# Patient Record
Sex: MALE | Employment: OTHER | URBAN - METROPOLITAN AREA
[De-identification: names, ages, dates, MRNs, and addresses within clinical notes are randomized per-mention and may not be internally consistent; named-entity substitution may affect disease eponyms.]

---

## 2019-10-11 DIAGNOSIS — M25.569 KNEE PAIN: Primary | ICD-10-CM

## 2019-10-18 ENCOUNTER — CLINICAL SUPPORT (OUTPATIENT)
Dept: REHABILITATION | Facility: HOSPITAL | Age: 74
End: 2019-10-18
Payer: MEDICARE

## 2019-10-18 DIAGNOSIS — S76.111A QUADRICEPS MUSCLE STRAIN, RIGHT, INITIAL ENCOUNTER: ICD-10-CM

## 2019-10-18 DIAGNOSIS — G89.29 CHRONIC PAIN OF RIGHT KNEE: ICD-10-CM

## 2019-10-18 DIAGNOSIS — M25.561 CHRONIC PAIN OF RIGHT KNEE: ICD-10-CM

## 2019-10-18 PROCEDURE — 97112 NEUROMUSCULAR REEDUCATION: CPT | Performed by: PHYSICAL THERAPIST

## 2019-10-18 PROCEDURE — 97161 PT EVAL LOW COMPLEX 20 MIN: CPT | Performed by: PHYSICAL THERAPIST

## 2019-10-18 PROCEDURE — 97110 THERAPEUTIC EXERCISES: CPT | Performed by: PHYSICAL THERAPIST

## 2019-10-18 NOTE — PLAN OF CARE
[facility]  Physical Therapy Initial Evaluation    Name: Keith Mccurdy  Clinic Number: [id number]    Therapy Diagnosis:   Encounter Diagnoses   Name Primary?    Chronic pain of right knee     Quadriceps muscle strain, right, initial encounter      Physician: Brandin Osullivan MD    Physician Orders: PT Eval and Treat:  core/lumbar program, patellofemoral and quadriceps program with gentle stretching/strengthening, hip abductor/ gluteal strengthening and stretching, gait training; modalities as needed - ok for ultrasound, ionto/phonophoresis, deep heat, manual massage, stim treatment, TENS, pilates/yoga stretching, acupuncture, and other modalities at discretion of therapist, ok for aquatherapy  Medical Diagnosis from Referral: Quadriceps muscle strain, right, sequela (S76.111S), primary osteoarthritis of right knee (M17.11), chronic pain of right knee (M25.561, G89.29)  Evaluation Date: 10/18/2019  Authorization Period Expiration: 11/29/2019  Plan of Care Expiration: 11/29/2019  Visit # / Visits authorized: 1/ 20    Precautions: Standard    Time In: 1140  Time Out: 1240  Total Billable Time: 60 minutes    SUBJECTIVE   Date of onset: early summer  History of current condition - Keith Mccurdy is a 74 y.o. male whom reports knee was on a batch of rocks when working in his yard one day in early summer, but did not experience any pain until afterwards.  He reports that he thought it would resolve on its own, but waited 2-4 weeks for the pain to become more intense, causing him to seek care from his physician.  Patient reports that imaging revealed no structural abnormality, and that he was prescribed physical therapy.  He reports that he had approximately 8 sessions (2 times per week) of physical therapy, but that the pain resolved at the front of the knee (points to patellar tendon) and began on the side and front of the knee (points to lateral quadriceps).  Patient reports that his knee has  "improved over the past two-three weeks. He reports that he has noticed more fluid movements and less swelling. He reports that he is having less pain sleeping at night and greater pain when laying down, but that this past week has been better. Patient also reports that pain is also present after initial standing from prolonged sitting.     Medical History:   No past medical history on file.    Surgical History:   Keith Mccurdy  has a past surgical history that includes Hernia repair.    Medications:   Keith currently has no medications in their medication list.    Allergies:   Review of patient's allergies indicates:  No Known Allergies     Imaging: MRI studies, X-Ray (2)     Prior Therapy: Yes  Social History: Patient lives with their spouse.  Occupation: Patient is self-employed, sit for 30 minutes at a time.  Patient works in the yard as a hobby.  Prior Level of Function: Independent with all activities.  Current Level of Function: Limited in regularly working in the lawn, driving, routine movements, makes him sit more.    Pain:  Current 5/10, worst 7/10, best 2/10   Location: right knee(s)  Description: Throbbing and "vascilates"  Aggravating Factors: Sitting, Night Time and knee extension  Easing Factors: massage, pain medication and heating pad    Dominant Extremity: Right    Pts goals: Pt reported goals are to remove this problem.    OBJECTIVE   (x = not tested due to pain and/or inability to obtain test position)    RANGE OF MOTION:    Knee ROM Right  10/18/2019 Left  10/18/2019 Pain/Dysfunction with Movement Goal   Knee Flexion (135) 120 132 Tightness with flexion    Knee Extension (0) 10 0 Pain with extension      STRENGTH:    L/E MMT Right  10/18/2019 Left  10/18/2019 Pain/Dysfunction with Movement Goal   Hip Flexion  4+/5 4+/5  5/5 B    Hip Extension  4/5 4/5  5/5 B   Hip Abduction  4/5 4/5  5/5 B   Knee Extension 4-/5 5/5  5/5 B   Knee Flexion 5/5 5/5  5/5 B       MUSCLE LENGTH:     Muscle Tested  " Right  10/18/2019 Left   10/18/2019 Goal   Hamstrings  decreased normal Normal B     Sensation:  Sensation is intact to light touch    Palpation: Unremarkable    Posture:  Pt presents with postural abnormalities which include: Knee flexion in standing    Gait Analysis: The patient ambulated with the following assistive device: none; the pt presents with the following gait abnormalities: decreased right knee extension during stance phase of gait      FUNCTION:     LOWER EXTREMITY FUNCTIONAL SCALE  Patient-reported functional assessment scores are rated as follows:  A score of 0/4 represents extreme difficulty or unable to perform the activity. A score of 1/4 represents quite a bit of difficulty. A score of 2/4 represents moderate difficulty. A score of 3/4 represents a little bit of difficulty. A score of 4/4 represents no difficulty.      10/18/2019   1. Any of your usual work, housework or school activities 4/4   2. Your usual hobbies, sporting 4/4   3. Getting in and out of tub 3/4   4. Walking between rooms 3/4   5. Putting on shoes or socks 3/4   6. Squatting 1/4   7. Lifting an object from the ground   4/4   8. Performing light activities around the home 4/4   9. Performing heavy activities around the home 2/4   10. Getting in and out of car 2/4   11. Walking 2 blocks 3/4   12.Walking a mile -/4   13. Getting up and down 1 flight of stairs 2/4   14. Standing for 1 hour 1/4   15. Sitting for an hour  2/4   16. Running on even ground  2/4   17. Running on uneven ground 2/4   18. Making sharp turns when running fast -/4   19. Hopping  2/4   20. Rolling over in bed 2/4       Patient reports 63.89% ability based on score of the Lower Extremity Functional Scale on 10/18/2019.    TREATMENT   Treatment Time In: 1210  Treatment Time Out: 1240  Total Treatment time separate from Evaluation: 30 minutes    Keith Mccurdy received therapeutic exercises to develop strength, flexibility and posture for 15 minutes  including:    Exercise 10/18/2019   Seated hamstring stretch 3 x 30 seconds   Glute set 10 x 10 seconds                           x = exercise details same as prior session    Keith Mccurdy participated in neuromuscular re-education activities to improve: Kinesthetic and Sense for 15 minutes. The following activities were included:  Kinesio tape was applied to the right knee 20% tension, 4.5 blocks.  Home Exercises and Patient Education Provided    Education/Self-Care provided:   Patient educated on the impairments noted above and the effects of physical therapy intervention to improve overall condition and QOL.     Written Home Exercises Provided: yes.  Exercises were reviewed and Keith Mccurdy was able to demonstrate them prior to the end of the session.  Keith Mccurdy demonstrated good  understanding of the education provided.     See EMR under Patient Instructions for exercises provided 10/18/2019.    ASSESSMENT   Keith is a 74 y.o. male referred to outpatient Physical Therapy with a medical diagnosis of chronic knee pain, quadriceps muscle strain, and primary osteoarthritis. Pt presents with impairments including: decreased ROM, decreased strength, decreased muscle length and decreased overall function.    Pt prognosis is Good.   Pt will benefit from skilled outpatient Physical Therapy to address the deficits stated above and in the chart below, provide pt/family education, and to maximize pt's level of independence.     Plan of care discussed with patient: Yes  Pt's spiritual, cultural and educational needs considered and patient is agreeable to the plan of care and goals as stated below:     Anticipated Barriers for therapy: none    Medical Necessity is demonstrated by the following  History  Co-morbidities and personal factors that may impact the plan of care Co-morbidities:   advanced age    Personal Factors:   no deficits     low   Examination  Body Structures and Functions, activity limitations and  participation restrictions that may impact the plan of care Body Regions:   lower extremities    Body Systems:    ROM  strength    Activity limitations:   Learning and applying knowledge  no deficits    General Tasks and Commands  no deficits    Communication  no deficits    Mobility  no deficits    Self care  no deficits    Domestic Life  doing yardwork    Interactions/Relationships  no deficits    Life Areas  no deficits    Community and Social Life  community life  recreation and leisure         low   Clinical Presentation stable and uncomplicated low   Decision Making/ Complexity Score: low       GOALS:    Short Term Goals:  3 weeks    1. Pain: Pt will demonstrate improved pain by reports of less than or equal to 6/10 worst pain on the verbal rating scale in order to progress toward maximal functional ability and improve QOL.    2. Function: Patient will demonstrate improved function as indicated by a score of greater than or equal to 70% on the Lower Extremity Functional Scale    3. Mobility: Patient will improve AROM to 50% of stated goals, listed in objective measures above, in order to progress towards independence with functional activities.     4. Strength: Patient will improve strength to 50% of stated goals, listed in objective measures above, in order to progress towards independence with functional activities.     5. HEP: Patient will demonstrate independence with HEP in order to progress toward functional independence.      Long Term Goals:  6 weeks    1. Pain: Pt will demonstrate improved pain by reports of less than or equal to 5/10 worst pain on the verbal rating scale in order to progress toward maximal functional ability and improve QOL.      2. Function: Patient will demonstrate improved function as indicated by a score of greater than or equal to 72% on the Lower Extremity Functional Scale    3. Mobility: Patient will improve AROM to stated goals, listed in objective measures above, in order to  return to maximal functional potential and improve quality of life.    4. Strength: Patient will improve strength to stated goals, listed in objective measures above, in order to improve functional independence and quality of life.    5. Patient will return to normal ADL's, IADL's, community involvement, recreational activities, and work-related activities with less than or equal to 5/10 pain and maximal function.         PLAN   Plan of care Certification: 10/18/2019 to 11/29/2019.    Outpatient Physical Therapy 2 times weekly for 6 weeks to include any combination of the following interventions: dry needling, modalities, electrical stimulation (IFC, Pre-Mod, Attended with Functional Dry Needling), Gait Training, Manual Therapy, Neuromuscular Re-ed, Patient Education, Self Care, Therapeutic Activites and Therapeutic Exercise     Thank you for this referral.    These services are reasonable and necessary for the conditions set forth above while under my care.    Sharona Avitia, PT, DPT, CSCS

## 2019-10-22 ENCOUNTER — CLINICAL SUPPORT (OUTPATIENT)
Dept: REHABILITATION | Facility: HOSPITAL | Age: 74
End: 2019-10-22
Payer: MEDICARE

## 2019-10-22 DIAGNOSIS — M25.561 CHRONIC PAIN OF RIGHT KNEE: ICD-10-CM

## 2019-10-22 DIAGNOSIS — S76.111A QUADRICEPS MUSCLE STRAIN, RIGHT, INITIAL ENCOUNTER: ICD-10-CM

## 2019-10-22 DIAGNOSIS — G89.29 CHRONIC PAIN OF RIGHT KNEE: ICD-10-CM

## 2019-10-22 PROCEDURE — 97110 THERAPEUTIC EXERCISES: CPT

## 2019-10-22 PROCEDURE — 97014 ELECTRIC STIMULATION THERAPY: CPT

## 2019-10-22 PROCEDURE — 97140 MANUAL THERAPY 1/> REGIONS: CPT

## 2019-10-22 NOTE — PROGRESS NOTES
"  Physical Therapy Daily Treatment Note     Name: Keith Mccurdy  Clinic Number: [id number]    Therapy Diagnosis:   Encounter Diagnoses   Name Primary?    Chronic pain of right knee     Quadriceps muscle strain, right, initial encounter      Physician: Brandin Osullivan MD    Visit Date: 10/22/2019    Physician Orders: PT Eval and Treat:  core/lumbar program, patellofemoral and quadriceps program with gentle stretching/strengthening, hip abductor/ gluteal strengthening and stretching, gait training; modalities as needed - ok for ultrasound, ionto/phonophoresis, deep heat, manual massage, stim treatment, TENS, pilates/yoga stretching, acupuncture, and other modalities at discretion of therapist, ok for aquatherapy  Medical Diagnosis from Referral: Quadriceps muscle strain, right, sequela (S76.111S), primary osteoarthritis of right knee (M17.11), chronic pain of right knee (M25.561, G89.29)  Evaluation Date: 10/18/2019  Authorization Period Expiration: 11/29/2019  Plan of Care Expiration: 11/29/2019  Visit # / Visits authorized: 1/ 20    Precautions:Standard    Time In: 0905  Time Out: 1000  Total Billable Time: 55 minutes    SUBJECTIVE     Pt reports: that he is doing fine today. His right knee is still giving him some issues but he feels like the tape helped.   He was compliant with home exercise program.  Response to previous treatment: decreased pain  Functional change: none noted    Pain: n/a/10  Location: Right knee  TREATMENT     Keith Mccurdy received therapeutic exercises to develop strength, flexibility and posture for 15 minutes including:    Exercise 10/22/2019   Hamstring stretch 10" 10x   Knee extension stretch 2'   Quad sets 30x5"   Supine heel slides  10x   Ball squeeze 20x   Hip abduction theraband Yellow 20x   Short arc quads 3x10           Keith Mccurdy received the following manual therapy techniques: patellar mobilization and soft tissue mobilization to the Right Knee and quadriceps      Keith JAIME " Kaz received the following supervised modalities after being cleared for contradictions: Interferential x 15 minutes to the right knee    Keith Mccurdy received hot pack for 15 minutes to right knee.      Home Exercises Provided and Patient Education Provided     Education/Self-Care provided: (minutes)    Written Home Exercises Provided: Patient instructed to cont prior HEP.  Exercises were reviewed and Keith Mccurdy was able to demonstrate them prior to the end of the session.  Keith Mccurdy demonstrated good  understanding of the education provided.     See EMR under Patient Instructions for exercises provided prior visit.    ASSESSMENT   Patient tolerated all activities well today with no complaints of discomfort but had good fatigue noted with all strengthening activities. Noted increased in right quad area, but patient did not have any increased tenderness during soft tissue mobilization. Patient left treatment feeling better and saying he worked it good.     Keith Mccurdy is progressing well towards his goals.   Pt prognosis is Good.     Pt will continue to benefit from skilled outpatient physical therapy to address the deficits listed in the problem list box on initial evaluation, provide pt/family education and to maximize pt's level of independence in the home and community environment.     Pt's spiritual, cultural and educational needs considered and pt agreeable to plan of care and goals.     Anticipated barriers to physical therapy: none    Goals:   Short Term Goals:  3 weeks     1. Pain: Pt will demonstrate improved pain by reports of less than or equal to 6/10 worst pain on the verbal rating scale in order to progress toward maximal functional ability and improve QOL.     2. Function: Patient will demonstrate improved function as indicated by a score of greater than or equal to 70% on the Lower Extremity Functional Scale     3. Mobility: Patient will improve AROM to 50% of stated goals, listed in  objective measures above, in order to progress towards independence with functional activities.      4. Strength: Patient will improve strength to 50% of stated goals, listed in objective measures above, in order to progress towards independence with functional activities.      5. HEP: Patient will demonstrate independence with HEP in order to progress toward functional independence.        Long Term Goals:  6 weeks     1. Pain: Pt will demonstrate improved pain by reports of less than or equal to 5/10 worst pain on the verbal rating scale in order to progress toward maximal functional ability and improve QOL.       2. Function: Patient will demonstrate improved function as indicated by a score of greater than or equal to 72% on the Lower Extremity Functional Scale     3. Mobility: Patient will improve AROM to stated goals, listed in objective measures above, in order to return to maximal functional potential and improve quality of life.     4. Strength: Patient will improve strength to stated goals, listed in objective measures above, in order to improve functional independence and quality of life.     5. Patient will return to normal ADL's, IADL's, community involvement, recreational activities, and work-related activities with less than or equal to 5/10 pain and maximal function.           PLAN   Plan of care Certification: 10/18/2019 to 11/29/2019.    Continue Plan of Care (POC) and progress per patient tolerance.    Erma Norman, PTA

## 2019-10-25 ENCOUNTER — CLINICAL SUPPORT (OUTPATIENT)
Dept: REHABILITATION | Facility: HOSPITAL | Age: 74
End: 2019-10-25
Payer: MEDICARE

## 2019-10-25 DIAGNOSIS — M25.561 CHRONIC PAIN OF RIGHT KNEE: ICD-10-CM

## 2019-10-25 DIAGNOSIS — G89.29 CHRONIC PAIN OF RIGHT KNEE: ICD-10-CM

## 2019-10-25 DIAGNOSIS — S76.111A QUADRICEPS MUSCLE STRAIN, RIGHT, INITIAL ENCOUNTER: ICD-10-CM

## 2019-10-25 PROCEDURE — 97110 THERAPEUTIC EXERCISES: CPT

## 2019-10-25 PROCEDURE — 97014 ELECTRIC STIMULATION THERAPY: CPT

## 2019-10-25 PROCEDURE — 97140 MANUAL THERAPY 1/> REGIONS: CPT

## 2019-10-25 NOTE — PROGRESS NOTES
"  Physical Therapy Daily Treatment Note     Name: Keith Mccurdy  Clinic Number: [id number]    Therapy Diagnosis:   Encounter Diagnoses   Name Primary?    Chronic pain of right knee     Quadriceps muscle strain, right, initial encounter      Physician: Brandin Osullivan MD    Visit Date: 10/25/2019    Physician Orders: PT Eval and Treat:  core/lumbar program, patellofemoral and quadriceps program with gentle stretching/strengthening, hip abductor/ gluteal strengthening and stretching, gait training; modalities as needed - ok for ultrasound, ionto/phonophoresis, deep heat, manual massage, stim treatment, TENS, pilates/yoga stretching, acupuncture, and other modalities at discretion of therapist, ok for aquatherapy  Medical Diagnosis from Referral: Quadriceps muscle strain, right, sequela (S76.111S), primary osteoarthritis of right knee (M17.11), chronic pain of right knee (M25.561, G89.29)  Evaluation Date: 10/18/2019  Authorization Period Expiration: 11/29/2019  Plan of Care Expiration: 11/29/2019  Visit # / Visits authorized: 3/ 20    Precautions:Standard    Time In: 0900  Time Out: 1000  Total Billable Time: 60 minutes    SUBJECTIVE     Pt reports: that he had the best night of sleep last night that he has had in a long time. He has been doing his exercises at home and he feels like they have really been helping. He also said he used a rice hot pack last night with some ginger oil and that may have helped too.   He was compliant with home exercise program.  Response to previous treatment: decreased pain  Functional change: none noted    Pain: n/a/10  Location: Right knee  TREATMENT     Keith Mccurdy received therapeutic exercises to develop strength, flexibility and posture for 15 minutes including:    Exercise 10/25/2019   Hamstring stretch 10" 10x   Knee extension stretch 2'   Quad sets 30x5"   Supine heel slides  10x   Ball squeeze 20x   Hip abduction theraband Yellow 20x   Short arc quads 3x10           Keith" JAIME Mccurdy received the following manual therapy techniques: patellar mobilization and soft tissue mobilization to the Right Knee and quadriceps for 15 minutes.       Keith Mccurdy received the following supervised modalities after being cleared for contradictions: Interferential x 15 minutes to the right knee    Keith Mccurdy received hot pack for 15 minutes to right knee.      Home Exercises Provided and Patient Education Provided     Education/Self-Care provided: (minutes)    Written Home Exercises Provided: Patient instructed to cont prior HEP.  Exercises were reviewed and Keith Mccurdy was able to demonstrate them prior to the end of the session.  Keith Mccurdy demonstrated good  understanding of the education provided.     See EMR under Patient Instructions for exercises provided prior visit.    ASSESSMENT   Patient tolerated all activities well today with no complaints of discomfort. Noted consistent swelling superolateral to the patella. Patient had decreased tenderness and tightness today during manual therapy.   Keith Mccurdy is progressing well towards his goals.   Pt prognosis is Good.     Pt will continue to benefit from skilled outpatient physical therapy to address the deficits listed in the problem list box on initial evaluation, provide pt/family education and to maximize pt's level of independence in the home and community environment.     Pt's spiritual, cultural and educational needs considered and pt agreeable to plan of care and goals.     Anticipated barriers to physical therapy: none    Goals:   Short Term Goals:  3 weeks     1. Pain: Pt will demonstrate improved pain by reports of less than or equal to 6/10 worst pain on the verbal rating scale in order to progress toward maximal functional ability and improve QOL.     2. Function: Patient will demonstrate improved function as indicated by a score of greater than or equal to 70% on the Lower Extremity Functional Scale     3. Mobility:  Patient will improve AROM to 50% of stated goals, listed in objective measures above, in order to progress towards independence with functional activities.      4. Strength: Patient will improve strength to 50% of stated goals, listed in objective measures above, in order to progress towards independence with functional activities.      5. HEP: Patient will demonstrate independence with HEP in order to progress toward functional independence.        Long Term Goals:  6 weeks     1. Pain: Pt will demonstrate improved pain by reports of less than or equal to 5/10 worst pain on the verbal rating scale in order to progress toward maximal functional ability and improve QOL.       2. Function: Patient will demonstrate improved function as indicated by a score of greater than or equal to 72% on the Lower Extremity Functional Scale     3. Mobility: Patient will improve AROM to stated goals, listed in objective measures above, in order to return to maximal functional potential and improve quality of life.     4. Strength: Patient will improve strength to stated goals, listed in objective measures above, in order to improve functional independence and quality of life.     5. Patient will return to normal ADL's, IADL's, community involvement, recreational activities, and work-related activities with less than or equal to 5/10 pain and maximal function.           PLAN   Plan of care Certification: 10/18/2019 to 11/29/2019.    Continue Plan of Care (POC) and progress per patient tolerance.    Erma Norman, PTA

## 2019-10-29 ENCOUNTER — CLINICAL SUPPORT (OUTPATIENT)
Dept: REHABILITATION | Facility: HOSPITAL | Age: 74
End: 2019-10-29
Payer: MEDICARE

## 2019-10-29 DIAGNOSIS — G89.29 CHRONIC PAIN OF RIGHT KNEE: ICD-10-CM

## 2019-10-29 DIAGNOSIS — S76.111A QUADRICEPS MUSCLE STRAIN, RIGHT, INITIAL ENCOUNTER: ICD-10-CM

## 2019-10-29 DIAGNOSIS — M25.561 CHRONIC PAIN OF RIGHT KNEE: ICD-10-CM

## 2019-10-29 PROCEDURE — 97140 MANUAL THERAPY 1/> REGIONS: CPT | Performed by: PHYSICAL THERAPIST

## 2019-10-29 PROCEDURE — 97110 THERAPEUTIC EXERCISES: CPT | Performed by: PHYSICAL THERAPIST

## 2019-10-29 NOTE — PROGRESS NOTES
"  Physical Therapy Daily Treatment Note     Name: Keith Mccurdy  Clinic Number: [id number]    Therapy Diagnosis:   Encounter Diagnoses   Name Primary?    Chronic pain of right knee     Quadriceps muscle strain, right, initial encounter      Physician: Brandin Osullivan MD    Visit Date: 10/29/2019    Physician Orders: PT Eval and Treat:  core/lumbar program, patellofemoral and quadriceps program with gentle stretching/strengthening, hip abductor/ gluteal strengthening and stretching, gait training; modalities as needed - ok for ultrasound, ionto/phonophoresis, deep heat, manual massage, stim treatment, TENS, pilates/yoga stretching, acupuncture, and other modalities at discretion of therapist, ok for aquatherapy  Medical Diagnosis from Referral: Quadriceps muscle strain, right, sequela (S76.111S), primary osteoarthritis of right knee (M17.11), chronic pain of right knee (M25.561, G89.29)  Evaluation Date: 10/18/2019  Authorization Period Expiration: 11/29/2019  Plan of Care Expiration: 11/29/2019  Visit # / Visits authorized: 4/ 20    Precautions:Standard    Time In: 1030  Time Out: 1130  Total Billable Time: 55 minutes    SUBJECTIVE     Pt reports that he and his wife have really noticed an improvement since beginning physical therapy.  He was compliant with home exercise program.  Response to previous treatment: decreased pain  Functional change: none noted    Pain: n/a/10  Location: Right knee  TREATMENT     Keith Mccurdy received therapeutic exercises to develop strength, flexibility and posture for 40 minutes including:    Exercise 10/25/2019 10/29/2019   Hamstring stretch 10" 10x x   Crossover stretch   10" 10x   Knee extension stretch 2' x   Quad sets 30x5" x   Supine heel slides  10x x   Ball squeeze 20x x   Hip abduction theraband Yellow 20x 30x   Short arc quads 3x10 x   Glute sets  10" 10x   Kinesio tape 4.5 blocks to right knee  x     Keith Mccurdy received the following manual therapy techniques: " patellar mobilization and soft tissue mobilization to the Right Knee and quadriceps for 15 minutes.     Home Exercises Provided and Patient Education Provided     Written Home Exercises Provided: Patient instructed to cont prior HEP.  Exercises were reviewed and Keith Mccurdy was able to demonstrate them prior to the end of the session.  Keith Mccurdy demonstrated good  understanding of the education provided.     See EMR under Patient Instructions for exercises provided prior visit.    ASSESSMENT   Patient tolerated all activities well today with no complaints of discomfort. Noted increased right knee flexion range of motion to 130 degrees following manual therapy and stretching.  Keith Mccurdy is progressing well towards his goals.   Pt prognosis is Good.     Pt will continue to benefit from skilled outpatient physical therapy to address the deficits listed in the problem list box on initial evaluation, provide pt/family education and to maximize pt's level of independence in the home and community environment.     Pt's spiritual, cultural and educational needs considered and pt agreeable to plan of care and goals.     Anticipated barriers to physical therapy: none    Goals:   Short Term Goals:  3 weeks     1. Pain: Pt will demonstrate improved pain by reports of less than or equal to 6/10 worst pain on the verbal rating scale in order to progress toward maximal functional ability and improve QOL.     2. Function: Patient will demonstrate improved function as indicated by a score of greater than or equal to 70% on the Lower Extremity Functional Scale     3. Mobility: Patient will improve AROM to 50% of stated goals, listed in objective measures above, in order to progress towards independence with functional activities.      4. Strength: Patient will improve strength to 50% of stated goals, listed in objective measures above, in order to progress towards independence with functional activities.      5. HEP:  Patient will demonstrate independence with HEP in order to progress toward functional independence.        Long Term Goals:  6 weeks     1. Pain: Pt will demonstrate improved pain by reports of less than or equal to 5/10 worst pain on the verbal rating scale in order to progress toward maximal functional ability and improve QOL.       2. Function: Patient will demonstrate improved function as indicated by a score of greater than or equal to 72% on the Lower Extremity Functional Scale     3. Mobility: Patient will improve AROM to stated goals, listed in objective measures above, in order to return to maximal functional potential and improve quality of life.     4. Strength: Patient will improve strength to stated goals, listed in objective measures above, in order to improve functional independence and quality of life.     5. Patient will return to normal ADL's, IADL's, community involvement, recreational activities, and work-related activities with less than or equal to 5/10 pain and maximal function.       PLAN   Plan of care Certification: 10/18/2019 to 11/29/2019.    Continue Plan of Care (POC) and progress per patient tolerance.    Sharona Avitia, PT, DPT, CSCS

## 2019-10-31 ENCOUNTER — CLINICAL SUPPORT (OUTPATIENT)
Dept: REHABILITATION | Facility: HOSPITAL | Age: 74
End: 2019-10-31
Payer: MEDICARE

## 2019-10-31 DIAGNOSIS — G89.29 CHRONIC PAIN OF RIGHT KNEE: ICD-10-CM

## 2019-10-31 DIAGNOSIS — S76.111A QUADRICEPS MUSCLE STRAIN, RIGHT, INITIAL ENCOUNTER: ICD-10-CM

## 2019-10-31 DIAGNOSIS — M25.561 CHRONIC PAIN OF RIGHT KNEE: ICD-10-CM

## 2019-10-31 PROCEDURE — 97014 ELECTRIC STIMULATION THERAPY: CPT

## 2019-10-31 PROCEDURE — 97110 THERAPEUTIC EXERCISES: CPT

## 2019-10-31 PROCEDURE — 97140 MANUAL THERAPY 1/> REGIONS: CPT

## 2019-10-31 NOTE — PROGRESS NOTES
"  Physical Therapy Daily Treatment Note     Name: Keith Mccurdy  Clinic Number: [id number]    Therapy Diagnosis:   Encounter Diagnoses   Name Primary?    Chronic pain of right knee     Quadriceps muscle strain, right, initial encounter      Physician: Brandin Osullivan MD    Visit Date: 10/31/2019    Physician Orders: PT Eval and Treat:  core/lumbar program, patellofemoral and quadriceps program with gentle stretching/strengthening, hip abductor/ gluteal strengthening and stretching, gait training; modalities as needed - ok for ultrasound, ionto/phonophoresis, deep heat, manual massage, stim treatment, TENS, pilates/yoga stretching, acupuncture, and other modalities at discretion of therapist, ok for aquatherapy  Medical Diagnosis from Referral: Quadriceps muscle strain, right, sequela (S76.111S), primary osteoarthritis of right knee (M17.11), chronic pain of right knee (M25.561, G89.29)  Evaluation Date: 10/18/2019  Authorization Period Expiration: 11/29/2019  Plan of Care Expiration: 11/29/2019  Visit # / Visits authorized: 5/ 20    Precautions:Standard    Time In: 1015  Time Out: 1115  Total Billable Time: 60 minutes    SUBJECTIVE     Pt reports that he has noticed a huge improvement since starting therapy.   He was compliant with home exercise program.  Response to previous treatment: decreased pain  Functional change: none noted    Pain: n/a/10  Location: Right knee  TREATMENT     Keith Mccurdy received therapeutic exercises to develop strength, flexibility and posture for 40 minutes including:    Exercise 10/31/2019   Hamstring stretch 3x30"   Crossover stretch     Knee extension stretch 2'   Quad sets 30x   Supine heel slides  10x   Ball squeeze 30x   Standing Hip abduction theraband 3x10 Red theraband   Short arc quads 3x10   LAQ 3x10   Glute sets 10" 10x   Kinesio tape 4.5 blocks to right knee x     Keith Mccurdy received the following manual therapy techniques: patellar mobilization and soft tissue " mobilization to the Right Knee and quadriceps for 15 minutes.     Home Exercises Provided and Patient Education Provided     Written Home Exercises Provided: Patient instructed to cont prior HEP.  Exercises were reviewed and Keith Mccurdy was able to demonstrate them prior to the end of the session.  Keith Mccurdy demonstrated good  understanding of the education provided.     See EMR under Patient Instructions for exercises provided prior visit.    ASSESSMENT   Patient tolerated all activities well today with no complaints of discomfort. Noted decreased swelling in anterolateral portion of the quad superior to the patella.   Keith Mccurdy is progressing well towards his goals.   Pt prognosis is Good.     Pt will continue to benefit from skilled outpatient physical therapy to address the deficits listed in the problem list box on initial evaluation, provide pt/family education and to maximize pt's level of independence in the home and community environment.     Pt's spiritual, cultural and educational needs considered and pt agreeable to plan of care and goals.     Anticipated barriers to physical therapy: none    Goals:   Short Term Goals:  3 weeks     1. Pain: Pt will demonstrate improved pain by reports of less than or equal to 6/10 worst pain on the verbal rating scale in order to progress toward maximal functional ability and improve QOL.     2. Function: Patient will demonstrate improved function as indicated by a score of greater than or equal to 70% on the Lower Extremity Functional Scale     3. Mobility: Patient will improve AROM to 50% of stated goals, listed in objective measures above, in order to progress towards independence with functional activities.      4. Strength: Patient will improve strength to 50% of stated goals, listed in objective measures above, in order to progress towards independence with functional activities.      5. HEP: Patient will demonstrate independence with HEP in order to  progress toward functional independence.        Long Term Goals:  6 weeks     1. Pain: Pt will demonstrate improved pain by reports of less than or equal to 5/10 worst pain on the verbal rating scale in order to progress toward maximal functional ability and improve QOL.       2. Function: Patient will demonstrate improved function as indicated by a score of greater than or equal to 72% on the Lower Extremity Functional Scale     3. Mobility: Patient will improve AROM to stated goals, listed in objective measures above, in order to return to maximal functional potential and improve quality of life.     4. Strength: Patient will improve strength to stated goals, listed in objective measures above, in order to improve functional independence and quality of life.     5. Patient will return to normal ADL's, IADL's, community involvement, recreational activities, and work-related activities with less than or equal to 5/10 pain and maximal function.       PLAN   Plan of care Certification: 10/18/2019 to 11/29/2019.    Continue Plan of Care (POC) and progress per patient tolerance.    Erma Norman, PTA

## 2019-11-05 ENCOUNTER — CLINICAL SUPPORT (OUTPATIENT)
Dept: REHABILITATION | Facility: HOSPITAL | Age: 74
End: 2019-11-05
Payer: MEDICARE

## 2019-11-05 DIAGNOSIS — S76.111A QUADRICEPS MUSCLE STRAIN, RIGHT, INITIAL ENCOUNTER: ICD-10-CM

## 2019-11-05 DIAGNOSIS — G89.29 CHRONIC PAIN OF RIGHT KNEE: ICD-10-CM

## 2019-11-05 DIAGNOSIS — M25.561 CHRONIC PAIN OF RIGHT KNEE: ICD-10-CM

## 2019-11-05 PROCEDURE — 97140 MANUAL THERAPY 1/> REGIONS: CPT | Performed by: PHYSICAL THERAPIST

## 2019-11-05 PROCEDURE — 97014 ELECTRIC STIMULATION THERAPY: CPT | Performed by: PHYSICAL THERAPIST

## 2019-11-05 PROCEDURE — 97110 THERAPEUTIC EXERCISES: CPT | Performed by: PHYSICAL THERAPIST

## 2019-11-07 ENCOUNTER — CLINICAL SUPPORT (OUTPATIENT)
Dept: REHABILITATION | Facility: HOSPITAL | Age: 74
End: 2019-11-07
Payer: MEDICARE

## 2019-11-07 DIAGNOSIS — S76.111A QUADRICEPS MUSCLE STRAIN, RIGHT, INITIAL ENCOUNTER: ICD-10-CM

## 2019-11-07 DIAGNOSIS — M25.561 CHRONIC PAIN OF RIGHT KNEE: ICD-10-CM

## 2019-11-07 DIAGNOSIS — G89.29 CHRONIC PAIN OF RIGHT KNEE: ICD-10-CM

## 2019-11-07 PROCEDURE — 97110 THERAPEUTIC EXERCISES: CPT

## 2019-11-07 PROCEDURE — 97014 ELECTRIC STIMULATION THERAPY: CPT

## 2019-11-07 PROCEDURE — 97140 MANUAL THERAPY 1/> REGIONS: CPT

## 2019-11-07 NOTE — PROGRESS NOTES
"  Physical Therapy Daily Treatment Note     Name: Keith Mccurdy  Clinic Number: [id number]    Therapy Diagnosis:   Encounter Diagnoses   Name Primary?    Chronic pain of right knee     Quadriceps muscle strain, right, initial encounter      Physician: Brandin Osullivan MD    Visit Date: 11/7/2019    Physician Orders: PT Eval and Treat:  core/lumbar program, patellofemoral and quadriceps program with gentle stretching/strengthening, hip abductor/ gluteal strengthening and stretching, gait training; modalities as needed - ok for ultrasound, ionto/phonophoresis, deep heat, manual massage, stim treatment, TENS, pilates/yoga stretching, acupuncture, and other modalities at discretion of therapist, ok for aquatherapy  Medical Diagnosis from Referral: Quadriceps muscle strain, right, sequela (S76.111S), primary osteoarthritis of right knee (M17.11), chronic pain of right knee (M25.561, G89.29)  Evaluation Date: 10/18/2019  Authorization Period Expiration: 11/29/2019  Plan of Care Expiration: 11/29/2019  Visit # / Visits authorized: 7/ 20    Precautions:Standard    Time In: 0925  Time Out: 1035  Total Billable Time: 70 minutes    SUBJECTIVE     Pt reports that his leg has been feeling better. Anytime he has any discomfort, he does his exercises at home, and he notices an immediate decrease in pain.   He was compliant with home exercise program.  Response to previous treatment: decreased pain  Functional change: none noted    Pain: n/a/10  Location: Right knee  TREATMENT     Keith Mccurdy received therapeutic exercises to develop strength, flexibility and posture for 40 minutes including:    Exercise 11/7/2019   Hamstring stretch seated 3x30"   Crossover stretch     Knee extension stretch 2'   Quad sets 30x   Supine heel slides     Ball squeeze 30x   Standing Hip abduction theraband 3x10 Red theraband   Short arc quads 3x10 2#   LAQ 3x10 2#   Glute sets    Kinesio tape 4.5 blocks to right knee x   Standing IT band stretch " "3x30"             Keith Mccurdy received the following manual therapy techniques: patellar mobilization and soft tissue mobilization to the Right Knee and quadriceps for 15 minutes.     Keith Mccurdy received the following supervised modalities after being cleared for contradictions: Interferential x 15 minutes to the right knee     Keith Mccurdy received hot pack for 15 minutes to right knee.    Home Exercises Provided and Patient Education Provided     Written Home Exercises Provided: Patient instructed to cont prior HEP.  Exercises were reviewed and Keith Mccurdy was able to demonstrate them prior to the end of the session.  Keith Mccurdy demonstrated good  understanding of the education provided.     See EMR under Patient Instructions for exercises provided prior visit.    ASSESSMENT   Patient tolerated all activities well today with no complaints of discomfort. Noted decreased swelling in anterolateral portion of the quad superior to the patella. Patient had some increased tightness in IT band area noted during manual therapy. Added standing IT band stretch and ankle weights for quad strengthening activities and patient responded well.   Keith Mccurdy is progressing well towards his goals.   Pt prognosis is Good.     Pt will continue to benefit from skilled outpatient physical therapy to address the deficits listed in the problem list box on initial evaluation, provide pt/family education and to maximize pt's level of independence in the home and community environment.     Pt's spiritual, cultural and educational needs considered and pt agreeable to plan of care and goals.     Anticipated barriers to physical therapy: none    Goals:   Short Term Goals:  3 weeks     1. Pain: Pt will demonstrate improved pain by reports of less than or equal to 6/10 worst pain on the verbal rating scale in order to progress toward maximal functional ability and improve QOL.     2. Function: Patient will demonstrate improved " function as indicated by a score of greater than or equal to 70% on the Lower Extremity Functional Scale     3. Mobility: Patient will improve AROM to 50% of stated goals, listed in objective measures above, in order to progress towards independence with functional activities.      4. Strength: Patient will improve strength to 50% of stated goals, listed in objective measures above, in order to progress towards independence with functional activities.      5. HEP: Patient will demonstrate independence with HEP in order to progress toward functional independence.        Long Term Goals:  6 weeks     1. Pain: Pt will demonstrate improved pain by reports of less than or equal to 5/10 worst pain on the verbal rating scale in order to progress toward maximal functional ability and improve QOL.       2. Function: Patient will demonstrate improved function as indicated by a score of greater than or equal to 72% on the Lower Extremity Functional Scale     3. Mobility: Patient will improve AROM to stated goals, listed in objective measures above, in order to return to maximal functional potential and improve quality of life.     4. Strength: Patient will improve strength to stated goals, listed in objective measures above, in order to improve functional independence and quality of life.     5. Patient will return to normal ADL's, IADL's, community involvement, recreational activities, and work-related activities with less than or equal to 5/10 pain and maximal function.       PLAN   Plan of care Certification: 10/18/2019 to 11/29/2019.    Continue Plan of Care (POC) and progress per patient tolerance.    Erma Norman, PTA

## 2019-11-08 NOTE — PROGRESS NOTES
"  Physical Therapy Daily Treatment Note     Name: Keith Mccurdy  Clinic Number: [id number]    Therapy Diagnosis:   Encounter Diagnoses   Name Primary?    Chronic pain of right knee     Quadriceps muscle strain, right, initial encounter      Physician: Brandin Osullivan MD    Visit Date: 11/5/2019    Physician Orders: PT Eval and Treat:  core/lumbar program, patellofemoral and quadriceps program with gentle stretching/strengthening, hip abductor/ gluteal strengthening and stretching, gait training; modalities as needed - ok for ultrasound, ionto/phonophoresis, deep heat, manual massage, stim treatment, TENS, pilates/yoga stretching, acupuncture, and other modalities at discretion of therapist, ok for aquatherapy  Medical Diagnosis from Referral: Quadriceps muscle strain, right, sequela (S76.111S), primary osteoarthritis of right knee (M17.11), chronic pain of right knee (M25.561, G89.29)  Evaluation Date: 10/18/2019  Authorization Period Expiration: 11/29/2019  Plan of Care Expiration: 11/29/2019  Visit # / Visits authorized: 6/ 20    Precautions:Standard    Time In: 1015  Time Out: 1115  Total Billable Time: 60 minutes    SUBJECTIVE     Pt reports that the tape really helps, and he likes that he is getting stronger in physical therapy.   He was compliant with home exercise program.  Response to previous treatment: decreased pain  Functional change: none noted    Pain: n/a/10  Location: Right knee  TREATMENT     Keith Mccurdy received moist heat and electrical stimulation to the right knee x 15 minutes to increase circulation.    Keith Mccurdy received therapeutic exercises to develop strength, flexibility and posture for 20 minutes with therapist and 15 minutes of supervised exercise including:    Exercise 10/31/2019 11/5/2019   Hamstring stretch 3x30" x   Crossover stretch   x   Knee extension stretch 2'    Quad sets 30x    Supine heel slides  10x x   Ball squeeze 30x x   Standing Hip abduction theraband 3x10 Red " "theraband x   Short arc quads 3x10 x   LAQ 3x10    Glute sets 10" 10x    Kinesio tape 4.5 blocks to right knee x Left intact   Sidelying hip abduction  3x10   Hip flexor stretch  3x30"   Sidelying tensor fascia ranulfo stretch  3x30"   Quadriceps stretch   3x30"          Keith Mccurdy received the following manual therapy techniques: soft tissue mobilization to the hamstrings, Iliotibial band, and quadriceps for 10 minutes.     Home Exercises Provided and Patient Education Provided     Written Home Exercises Provided: Patient instructed to cont prior HEP.  Exercises were reviewed and Keith Mccurdy was able to demonstrate them prior to the end of the session.  Keith Mccurdy demonstrated good  understanding of the education provided.     See EMR under Patient Instructions for exercises provided prior visit.    ASSESSMENT   Patient fatigued with addition of posterior strengthening activities today.  He had no complaints of discomfort.  Kinesio tape was left intact.  Instructed patient to discontinue sleeping with a pillow under his knee.  Keith Mccurdy is progressing well towards his goals.   Pt prognosis is Good.     Pt will continue to benefit from skilled outpatient physical therapy to address the deficits listed in the problem list box on initial evaluation, provide pt/family education and to maximize pt's level of independence in the home and community environment.     Pt's spiritual, cultural and educational needs considered and pt agreeable to plan of care and goals.     Anticipated barriers to physical therapy: none    Goals:   Short Term Goals:  3 weeks     1. Pain: Pt will demonstrate improved pain by reports of less than or equal to 6/10 worst pain on the verbal rating scale in order to progress toward maximal functional ability and improve QOL.     2. Function: Patient will demonstrate improved function as indicated by a score of greater than or equal to 70% on the Lower Extremity Functional Scale   "   3. Mobility: Patient will improve AROM to 50% of stated goals, listed in objective measures above, in order to progress towards independence with functional activities.      4. Strength: Patient will improve strength to 50% of stated goals, listed in objective measures above, in order to progress towards independence with functional activities.      5. HEP: Patient will demonstrate independence with HEP in order to progress toward functional independence.        Long Term Goals:  6 weeks     1. Pain: Pt will demonstrate improved pain by reports of less than or equal to 5/10 worst pain on the verbal rating scale in order to progress toward maximal functional ability and improve QOL.       2. Function: Patient will demonstrate improved function as indicated by a score of greater than or equal to 72% on the Lower Extremity Functional Scale     3. Mobility: Patient will improve AROM to stated goals, listed in objective measures above, in order to return to maximal functional potential and improve quality of life.     4. Strength: Patient will improve strength to stated goals, listed in objective measures above, in order to improve functional independence and quality of life.     5. Patient will return to normal ADL's, IADL's, community involvement, recreational activities, and work-related activities with less than or equal to 5/10 pain and maximal function.       PLAN   Plan of care Certification: 10/18/2019 to 11/29/2019.    Continue Plan of Care (POC) and progress per patient tolerance.    Sharona Avitia, PT, DPT, CSCS

## 2019-11-12 ENCOUNTER — CLINICAL SUPPORT (OUTPATIENT)
Dept: REHABILITATION | Facility: HOSPITAL | Age: 74
End: 2019-11-12
Payer: MEDICARE

## 2019-11-12 DIAGNOSIS — G89.29 CHRONIC PAIN OF RIGHT KNEE: ICD-10-CM

## 2019-11-12 DIAGNOSIS — M25.561 CHRONIC PAIN OF RIGHT KNEE: ICD-10-CM

## 2019-11-12 DIAGNOSIS — S76.111A QUADRICEPS MUSCLE STRAIN, RIGHT, INITIAL ENCOUNTER: ICD-10-CM

## 2019-11-12 PROCEDURE — 97110 THERAPEUTIC EXERCISES: CPT

## 2019-11-12 PROCEDURE — 97014 ELECTRIC STIMULATION THERAPY: CPT

## 2019-11-12 PROCEDURE — 97140 MANUAL THERAPY 1/> REGIONS: CPT

## 2019-11-12 NOTE — PROGRESS NOTES
"  Physical Therapy Daily Treatment Note     Name: Keith Mccurdy  Clinic Number: [id number]    Therapy Diagnosis:   Encounter Diagnoses   Name Primary?    Chronic pain of right knee     Quadriceps muscle strain, right, initial encounter      Physician: Brandin Osullivan MD    Visit Date: 11/12/2019    Physician Orders: PT Eval and Treat:  core/lumbar program, patellofemoral and quadriceps program with gentle stretching/strengthening, hip abductor/ gluteal strengthening and stretching, gait training; modalities as needed - ok for ultrasound, ionto/phonophoresis, deep heat, manual massage, stim treatment, TENS, pilates/yoga stretching, acupuncture, and other modalities at discretion of therapist, ok for aquatherapy  Medical Diagnosis from Referral: Quadriceps muscle strain, right, sequela (S76.111S), primary osteoarthritis of right knee (M17.11), chronic pain of right knee (M25.561, G89.29)  Evaluation Date: 10/18/2019  Authorization Period Expiration: 11/29/2019  Plan of Care Expiration: 11/29/2019  Visit # / Visits authorized: 8/ 20    Precautions:Standard    Time In: 0930  Time Out: 1030  Total Billable Time: 60 minutes    SUBJECTIVE     Pt reports that he has noticed significant improvement with his knee over the last few days.    He was compliant with home exercise program.  Response to previous treatment: decreased pain  Functional change: none noted    Pain: n/a/10  Location: Right knee  TREATMENT     Keith Mccurdy received therapeutic exercises to develop strength, flexibility and posture for 30 minutes including:    Exercise 11/7/2019   Hamstring stretch seated 3x30"   Crossover stretch     Knee extension stretch 2'   Quad sets 30x   Supine heel slides     Ball squeeze 30x   Standing Hip abduction theraband 3x10 Red theraband   Short arc quads 3x10 2#   LAQ 3x10 2#   Glute sets    Kinesio tape 4.5 blocks to right knee x   Standing IT band stretch 3x30"             Keith Mccurdy received the following manual " therapy techniques: patellar mobilization and soft tissue mobilization to the Right Knee and quadriceps for 15 minutes.     Keith Mccurdy received the following supervised modalities after being cleared for contradictions: Interferential x 15 minutes to the right knee     Keith Mccurdy received hot pack for 15 minutes to right knee.    Home Exercises Provided and Patient Education Provided   Educated patient on performing IT band stretch at home.     Written Home Exercises Provided: Patient instructed to cont prior HEP.  Exercises were reviewed and Keith Mccurdy was able to demonstrate them prior to the end of the session.  Keith Mccurdy demonstrated good  understanding of the education provided.     See EMR under Patient Instructions for exercises provided prior visit.    ASSESSMENT   Patient tolerated all activities well today with no complaints of discomfort. Noted some increased tightness and tenderness in IT band and anterior portion of the knee superior to the patella, which decreased with STM.   Keith Mccurdy is progressing well towards his goals.   Pt prognosis is Good.     Pt will continue to benefit from skilled outpatient physical therapy to address the deficits listed in the problem list box on initial evaluation, provide pt/family education and to maximize pt's level of independence in the home and community environment.     Pt's spiritual, cultural and educational needs considered and pt agreeable to plan of care and goals.     Anticipated barriers to physical therapy: none    Goals:   Short Term Goals:  3 weeks     1. Pain: Pt will demonstrate improved pain by reports of less than or equal to 6/10 worst pain on the verbal rating scale in order to progress toward maximal functional ability and improve QOL.     2. Function: Patient will demonstrate improved function as indicated by a score of greater than or equal to 70% on the Lower Extremity Functional Scale     3. Mobility: Patient will improve  AROM to 50% of stated goals, listed in objective measures above, in order to progress towards independence with functional activities.      4. Strength: Patient will improve strength to 50% of stated goals, listed in objective measures above, in order to progress towards independence with functional activities.      5. HEP: Patient will demonstrate independence with HEP in order to progress toward functional independence.        Long Term Goals:  6 weeks     1. Pain: Pt will demonstrate improved pain by reports of less than or equal to 5/10 worst pain on the verbal rating scale in order to progress toward maximal functional ability and improve QOL.       2. Function: Patient will demonstrate improved function as indicated by a score of greater than or equal to 72% on the Lower Extremity Functional Scale     3. Mobility: Patient will improve AROM to stated goals, listed in objective measures above, in order to return to maximal functional potential and improve quality of life.     4. Strength: Patient will improve strength to stated goals, listed in objective measures above, in order to improve functional independence and quality of life.     5. Patient will return to normal ADL's, IADL's, community involvement, recreational activities, and work-related activities with less than or equal to 5/10 pain and maximal function.       PLAN   Plan of care Certification: 10/18/2019 to 11/29/2019.    Continue Plan of Care (POC) and progress per patient tolerance.    Erma Norman, PTA

## 2019-11-14 ENCOUNTER — CLINICAL SUPPORT (OUTPATIENT)
Dept: REHABILITATION | Facility: HOSPITAL | Age: 74
End: 2019-11-14
Payer: MEDICARE

## 2019-11-14 DIAGNOSIS — G89.29 CHRONIC PAIN OF RIGHT KNEE: ICD-10-CM

## 2019-11-14 DIAGNOSIS — M25.561 CHRONIC PAIN OF RIGHT KNEE: ICD-10-CM

## 2019-11-14 DIAGNOSIS — S76.111A QUADRICEPS MUSCLE STRAIN, RIGHT, INITIAL ENCOUNTER: ICD-10-CM

## 2019-11-14 PROCEDURE — 97110 THERAPEUTIC EXERCISES: CPT

## 2019-11-14 PROCEDURE — 97140 MANUAL THERAPY 1/> REGIONS: CPT

## 2019-11-14 NOTE — PROGRESS NOTES
"  Physical Therapy Daily Treatment Note     Name: Keith Mccurdy  Clinic Number: [id number]    Therapy Diagnosis:   Encounter Diagnoses   Name Primary?    Chronic pain of right knee     Quadriceps muscle strain, right, initial encounter      Physician: Brandin Osullivan MD    Visit Date: 11/14/2019    Physician Orders: PT Eval and Treat:  core/lumbar program, patellofemoral and quadriceps program with gentle stretching/strengthening, hip abductor/ gluteal strengthening and stretching, gait training; modalities as needed - ok for ultrasound, ionto/phonophoresis, deep heat, manual massage, stim treatment, TENS, pilates/yoga stretching, acupuncture, and other modalities at discretion of therapist, ok for aquatherapy  Medical Diagnosis from Referral: Quadriceps muscle strain, right, sequela (S76.111S), primary osteoarthritis of right knee (M17.11), chronic pain of right knee (M25.561, G89.29)  Evaluation Date: 10/18/2019  Authorization Period Expiration: 11/29/2019  Plan of Care Expiration: 11/29/2019  Visit # / Visits authorized: 8/ 20    Precautions:Standard    Time In: 0930  Time Out: 1030  Total Billable Time: 60 minutes    SUBJECTIVE     Pt reports that he had some increased "sharp pain" in his upper quad area a couple of days ago, but it is better today.   He was compliant with home exercise program.  Response to previous treatment: decreased pain  Functional change: none noted    Pain: n/a/10  Location: Right knee  TREATMENT     Keith Mccurdy received therapeutic exercises to develop strength, flexibility and posture for 45 minutes including:    Exercise    Hamstring stretch seated 3x30"   Crossover stretch     Knee extension stretch 3'   Quad sets 30x   Supine heel slides     Ball squeeze 30x   Standing Hip abduction theraband 3x10 Red theraband   Short arc quads    LAQ 3x10 2#   Glute sets    Kinesio tape 4.5 blocks to right knee x   Standing IT band stretch 3x30"   Bike 10'   Straight leg raises 3x8     Keith " JAIME Mccurdy received the following manual therapy techniques: patellar mobilization and soft tissue mobilization to the Right Knee and quadriceps for 15 minutes.     Keith Mccurdy received the following supervised modalities after being cleared for contradictions: Interferential x 0 minutes to the right knee     Keith Mccurdy received hot pack for 0 minutes to right knee.    Home Exercises Provided and Patient Education Provided   Educated patient on performing SLRs at home.     Written Home Exercises Provided: Patient instructed to cont prior HEP.  Exercises were reviewed and Keith Mccurdy was able to demonstrate them prior to the end of the session.  Keith Mccurdy demonstrated good  understanding of the education provided.     See EMR under Patient Instructions for exercises provided prior visit.    ASSESSMENT   Patient tolerated all activities well today with no complaints of discomfort. Noted significant decreased in tightness and tenderness with IT band today. Added straight leg raises and recumbent bike and patient responded well.   Keith Mccurdy is progressing well towards his goals.   Pt prognosis is Good.     Pt will continue to benefit from skilled outpatient physical therapy to address the deficits listed in the problem list box on initial evaluation, provide pt/family education and to maximize pt's level of independence in the home and community environment.     Pt's spiritual, cultural and educational needs considered and pt agreeable to plan of care and goals.     Anticipated barriers to physical therapy: none    Goals:   Short Term Goals:  3 weeks     1. Pain: Pt will demonstrate improved pain by reports of less than or equal to 6/10 worst pain on the verbal rating scale in order to progress toward maximal functional ability and improve QOL.     2. Function: Patient will demonstrate improved function as indicated by a score of greater than or equal to 70% on the Lower Extremity Functional Scale      3. Mobility: Patient will improve AROM to 50% of stated goals, listed in objective measures above, in order to progress towards independence with functional activities.      4. Strength: Patient will improve strength to 50% of stated goals, listed in objective measures above, in order to progress towards independence with functional activities.      5. HEP: Patient will demonstrate independence with HEP in order to progress toward functional independence.        Long Term Goals:  6 weeks     1. Pain: Pt will demonstrate improved pain by reports of less than or equal to 5/10 worst pain on the verbal rating scale in order to progress toward maximal functional ability and improve QOL.       2. Function: Patient will demonstrate improved function as indicated by a score of greater than or equal to 72% on the Lower Extremity Functional Scale     3. Mobility: Patient will improve AROM to stated goals, listed in objective measures above, in order to return to maximal functional potential and improve quality of life.     4. Strength: Patient will improve strength to stated goals, listed in objective measures above, in order to improve functional independence and quality of life.     5. Patient will return to normal ADL's, IADL's, community involvement, recreational activities, and work-related activities with less than or equal to 5/10 pain and maximal function.       PLAN   Plan of care Certification: 10/18/2019 to 11/29/2019.    Continue Plan of Care (POC) and progress per patient tolerance.    Erma Norman, PTA

## 2019-11-18 ENCOUNTER — DOCUMENTATION ONLY (OUTPATIENT)
Dept: REHABILITATION | Facility: HOSPITAL | Age: 74
End: 2019-11-18

## 2019-11-18 NOTE — PROGRESS NOTES
PT/PTA met face to face to discuss patient's treatment plan and progress towards established goals. Patient will be seen by physical therapist every sixth visit and minimally once per month.     Erma Norman PTA

## 2019-11-22 ENCOUNTER — CLINICAL SUPPORT (OUTPATIENT)
Dept: REHABILITATION | Facility: HOSPITAL | Age: 74
End: 2019-11-22
Payer: MEDICARE

## 2019-11-22 DIAGNOSIS — G89.29 CHRONIC PAIN OF RIGHT KNEE: ICD-10-CM

## 2019-11-22 DIAGNOSIS — S76.111A QUADRICEPS MUSCLE STRAIN, RIGHT, INITIAL ENCOUNTER: ICD-10-CM

## 2019-11-22 DIAGNOSIS — M25.561 CHRONIC PAIN OF RIGHT KNEE: ICD-10-CM

## 2019-11-22 PROCEDURE — 97014 ELECTRIC STIMULATION THERAPY: CPT | Performed by: PHYSICAL THERAPIST

## 2019-11-22 PROCEDURE — 97140 MANUAL THERAPY 1/> REGIONS: CPT | Performed by: PHYSICAL THERAPIST

## 2019-11-22 NOTE — PROGRESS NOTES
Physical Therapy Daily Treatment Note     Name: Keith Mccurdy  Clinic Number: [id number]    Therapy Diagnosis:   Encounter Diagnoses   Name Primary?    Chronic pain of right knee     Quadriceps muscle strain, right, initial encounter      Physician: Brandin Osullivan MD    Visit Date: 11/22/2019    Physician Orders: PT Eval and Treat:  core/lumbar program, patellofemoral and quadriceps program with gentle stretching/strengthening, hip abductor/ gluteal strengthening and stretching, gait training; modalities as needed - ok for ultrasound, ionto/phonophoresis, deep heat, manual massage, stim treatment, TENS, pilates/yoga stretching, acupuncture, and other modalities at discretion of therapist, ok for aquatherapy  Medical Diagnosis from Referral: Quadriceps muscle strain, right, sequela (S76.111S), primary osteoarthritis of right knee (M17.11), chronic pain of right knee (M25.561, G89.29)  Evaluation Date: 10/18/2019  Authorization Period Expiration: 11/29/2019  Plan of Care Expiration: 11/29/2019  Visit # / Visits authorized: 9/ 20    Precautions:Standard    Time In: 0925  Time Out: 1030  Total Billable Time: 30 minutes    SUBJECTIVE     Pt reports that he has been doing so much better since beginning physical therapy.  He reports that he no longer has pain in his knee, but is mostly having pain in the right hip.  He was compliant with home exercise program.  Response to previous treatment: decreased pain  Functional change: none noted    Pain: n/a/10  Location: Right knee  TREATMENT     Keith Mccurdy received the following supervised modalities after being cleared for contradictions: Interferential x 15 minutes to the right knee     Keith Mccurdy received hot pack for 15 minutes to right knee.    Keith Mccurdy received the following manual therapy techniques: myofascial release of the right piriformis muscle and soft tissue mobilization to the right iliotibial band and quadriceps for 15 minutes.     Keith SANDOVAL  "Kaz received therapeutic exercises to develop strength, flexibility and posture for 30 minutes of supervised exercise including:    Exercise 11/22/2019   Piriformis stretch modified 3x30"   Hamstring stretch seated 3x30"   Crossover stretch     Knee extension stretch 3'   Quad sets 30x   Supine heel slides     Ball squeeze 30x   Standing Hip abduction theraband    Short arc quads    LAQ 3x10 2#   Glute sets    Kinesio tape 4.5 blocks to right knee x   Standing IT band stretch    Bike    Straight leg raises 3x8     Home Exercises Provided and Patient Education Provided   Educated patient on performing SLRs at home.     Written Home Exercises Provided: Patient instructed to cont prior HEP.  Exercises were reviewed and Keith Mccurdy was able to demonstrate them prior to the end of the session.  Keith Mccurdy demonstrated good  understanding of the education provided.     See EMR under Patient Instructions for exercises provided prior visit.    ASSESSMENT   Patient had hypertonicity of the right piriformis, which normalized following manual therapy.  He tolerated piriformis stretch well and had no complaint of increased pain with therapeutic exercise.  Keith Mccurdy is progressing well towards his goals.   Pt prognosis is Good.     Pt will continue to benefit from skilled outpatient physical therapy to address the deficits listed in the problem list box on initial evaluation, provide pt/family education and to maximize pt's level of independence in the home and community environment.     Pt's spiritual, cultural and educational needs considered and pt agreeable to plan of care and goals.     Anticipated barriers to physical therapy: none    Goals:   Short Term Goals:  3 weeks     1. Pain: Pt will demonstrate improved pain by reports of less than or equal to 6/10 worst pain on the verbal rating scale in order to progress toward maximal functional ability and improve QOL.  Met 11/22/2019   2. Function: Patient will " demonstrate improved function as indicated by a score of greater than or equal to 70% on the Lower Extremity Functional Scale  Progressing, not met 11/22/2019   3. Mobility: Patient will improve AROM to 50% of stated goals, listed in objective measures above, in order to progress towards independence with functional activities.  Progressing, not met 11/22/2019   4. Strength: Patient will improve strength to 50% of stated goals, listed in objective measures above, in order to progress towards independence with functional activities.  Progressing, not met 11/22/2019   5. HEP: Patient will demonstrate independence with HEP in order to progress toward functional independence.  Met 11/22/2019      Long Term Goals:  6 weeks     1. Pain: Pt will demonstrate improved pain by reports of less than or equal to 5/10 worst pain on the verbal rating scale in order to progress toward maximal functional ability and improve QOL.   Met 11/22/2019   2. Function: Patient will demonstrate improved function as indicated by a score of greater than or equal to 72% on the Lower Extremity Functional Scale Progressing, not met 11/22/2019   3. Mobility: Patient will improve AROM to stated goals, listed in objective measures above, in order to return to maximal functional potential and improve quality of life. Progressing, not met 11/22/2019   4. Strength: Patient will improve strength to stated goals, listed in objective measures above, in order to improve functional independence and quality of life. Progressing, not met 11/22/2019   5. Patient will return to normal ADL's, IADL's, community involvement, recreational activities, and work-related activities with less than or equal to 5/10 pain and maximal function.  Met 11/22/2019     PLAN   Plan of care Certification: 10/18/2019 to 11/29/2019.    Continue Plan of Care (POC) and progress per patient tolerance.    Sharona Avitia, PT, DPT, CSCS

## 2019-11-29 ENCOUNTER — CLINICAL SUPPORT (OUTPATIENT)
Dept: REHABILITATION | Facility: HOSPITAL | Age: 74
End: 2019-11-29
Payer: MEDICARE

## 2019-11-29 DIAGNOSIS — S76.111A QUADRICEPS MUSCLE STRAIN, RIGHT, INITIAL ENCOUNTER: ICD-10-CM

## 2019-11-29 DIAGNOSIS — M25.561 CHRONIC PAIN OF RIGHT KNEE: ICD-10-CM

## 2019-11-29 DIAGNOSIS — G89.29 CHRONIC PAIN OF RIGHT KNEE: ICD-10-CM

## 2019-11-29 PROCEDURE — 97140 MANUAL THERAPY 1/> REGIONS: CPT | Performed by: PHYSICAL THERAPIST

## 2019-11-29 PROCEDURE — 97110 THERAPEUTIC EXERCISES: CPT | Performed by: PHYSICAL THERAPIST

## 2019-11-29 PROCEDURE — 97014 ELECTRIC STIMULATION THERAPY: CPT | Performed by: PHYSICAL THERAPIST

## 2019-11-29 NOTE — PROGRESS NOTES
Physical Therapy Daily Treatment Note     Name: Keith Mccurdy  Clinic Number: [id number]    Therapy Diagnosis:   No diagnosis found.  Physician: Brandin Osullivan MD    Visit Date: 11/29/2019    Physician Orders: PT Eval and Treat:  core/lumbar program, patellofemoral and quadriceps program with gentle stretching/strengthening, hip abductor/ gluteal strengthening and stretching, gait training; modalities as needed - ok for ultrasound, ionto/phonophoresis, deep heat, manual massage, stim treatment, TENS, pilates/yoga stretching, acupuncture, and other modalities at discretion of therapist, ok for aquatherapy  Medical Diagnosis from Referral: Quadriceps muscle strain, right, sequela (S76.111S), primary osteoarthritis of right knee (M17.11), chronic pain of right knee (M25.561, G89.29)  Evaluation Date: 10/18/2019  Authorization Period Expiration: 11/29/2019  Plan of Care Expiration: 11/29/2019  Visit # / Visits authorized: 10/ 20    Precautions:Standard    Time In: 0930  Time Out: 1030  Total Billable Time: 15 minutes    SUBJECTIVE     Pt reports that he only has the sensation down his iliotibial band, but feels much better otherwise.  He was compliant with home exercise program.  Response to previous treatment: decreased pain  Functional change: none noted    Pain: n/a/10  Location: Right knee  TREATMENT     Keith Mccurdy received the following supervised modalities after being cleared for contradictions: Interferential x 15 minutes to the right knee     Keith Mccurdy received hot pack for 15 minutes to right knee.    Keith Mccurdy received the following manual therapy techniques: myofascial release of the right piriformis muscle and soft tissue mobilization to the right iliotibial band and quadriceps for 15 minutes.     Keith Mccurdy received therapeutic exercises to develop strength, flexibility and posture for 30 minutes of supervised exercise including:    Exercise 11/22/2019 11/29/2019   Piriformis stretch  "modified 3x30" x   Hamstring stretch seated 3x30" x   Crossover stretch      Knee extension stretch 3' x   Quad sets 30x x   Supine heel slides      Ball squeeze 30x x   Standing Hip abduction theraband     Short arc quads     LAQ 3x10 2# x   Glute sets     Kinesio tape 4.5 blocks to right knee x    Standing IT band stretch     Bike     Straight leg raises 3x8 x     Home Exercises Provided and Patient Education Provided   Educated patient on performing SLRs at home.     Written Home Exercises Provided: Patient instructed to cont prior HEP.  Exercises were reviewed and Keith Mccurdy was able to demonstrate them prior to the end of the session.  Keith Mccurdy demonstrated good  understanding of the education provided.     See EMR under Patient Instructions for exercises provided prior visit.    ASSESSMENT   Patient tolerated session well with no complaints of pain.  Discontinued kinesio tape to the knee due to patient no longer complaining of knee pain.  Keith Mccurdy is progressing well towards his goals.   Pt prognosis is Good.     Pt will continue to benefit from skilled outpatient physical therapy to address the deficits listed in the problem list box on initial evaluation, provide pt/family education and to maximize pt's level of independence in the home and community environment.     Pt's spiritual, cultural and educational needs considered and pt agreeable to plan of care and goals.     Anticipated barriers to physical therapy: none    Goals:   Short Term Goals:  3 weeks     1. Pain: Pt will demonstrate improved pain by reports of less than or equal to 6/10 worst pain on the verbal rating scale in order to progress toward maximal functional ability and improve QOL.  Met 11/22/2019   2. Function: Patient will demonstrate improved function as indicated by a score of greater than or equal to 70% on the Lower Extremity Functional Scale  Progressing, not met 11/22/2019   3. Mobility: Patient will improve AROM to " 50% of stated goals, listed in objective measures above, in order to progress towards independence with functional activities.  Progressing, not met 11/22/2019   4. Strength: Patient will improve strength to 50% of stated goals, listed in objective measures above, in order to progress towards independence with functional activities.  Progressing, not met 11/22/2019   5. HEP: Patient will demonstrate independence with HEP in order to progress toward functional independence.  Met 11/22/2019      Long Term Goals:  6 weeks     1. Pain: Pt will demonstrate improved pain by reports of less than or equal to 5/10 worst pain on the verbal rating scale in order to progress toward maximal functional ability and improve QOL.   Met 11/22/2019   2. Function: Patient will demonstrate improved function as indicated by a score of greater than or equal to 72% on the Lower Extremity Functional Scale Progressing, not met 11/22/2019   3. Mobility: Patient will improve AROM to stated goals, listed in objective measures above, in order to return to maximal functional potential and improve quality of life. Progressing, not met 11/22/2019   4. Strength: Patient will improve strength to stated goals, listed in objective measures above, in order to improve functional independence and quality of life. Progressing, not met 11/22/2019   5. Patient will return to normal ADL's, IADL's, community involvement, recreational activities, and work-related activities with less than or equal to 5/10 pain and maximal function.  Met 11/22/2019     PLAN   Plan of care Certification: 10/18/2019 to 11/29/2019.    Continue Plan of Care (POC) and progress per patient tolerance.    Sharona Avitia, PT, DPT, CSCS

## 2019-12-02 ENCOUNTER — CLINICAL SUPPORT (OUTPATIENT)
Dept: REHABILITATION | Facility: HOSPITAL | Age: 74
End: 2019-12-02
Payer: MEDICARE

## 2019-12-02 DIAGNOSIS — M25.561 CHRONIC PAIN OF RIGHT KNEE: ICD-10-CM

## 2019-12-02 DIAGNOSIS — G89.29 CHRONIC PAIN OF RIGHT KNEE: ICD-10-CM

## 2019-12-02 DIAGNOSIS — S76.111A QUADRICEPS MUSCLE STRAIN, RIGHT, INITIAL ENCOUNTER: ICD-10-CM

## 2019-12-02 PROCEDURE — 97140 MANUAL THERAPY 1/> REGIONS: CPT | Performed by: PHYSICAL THERAPIST

## 2019-12-02 PROCEDURE — 97110 THERAPEUTIC EXERCISES: CPT | Performed by: PHYSICAL THERAPIST

## 2019-12-02 NOTE — PROGRESS NOTES
Physical Therapy Daily Treatment Note     Name: Keith Mccurdy  Clinic Number: [id number]    Therapy Diagnosis:   Encounter Diagnoses   Name Primary?    Chronic pain of right knee     Quadriceps muscle strain, right, initial encounter      Physician: Brandin Osullivan MD    Visit Date: 12/2/2019    Physician Orders: PT Eval and Treat:  core/lumbar program, patellofemoral and quadriceps program with gentle stretching/strengthening, hip abductor/ gluteal strengthening and stretching, gait training; modalities as needed - ok for ultrasound, ionto/phonophoresis, deep heat, manual massage, stim treatment, TENS, pilates/yoga stretching, acupuncture, and other modalities at discretion of therapist, ok for aquatherapy  Medical Diagnosis from Referral: Quadriceps muscle strain, right, sequela (S76.111S), primary osteoarthritis of right knee (M17.11), chronic pain of right knee (M25.561, G89.29)  Evaluation Date: 10/18/2019  Authorization Period Expiration: 12/29/2019  Plan of Care Expiration: 12/29/2019  Visit # / Visits authorized: 11/ 20    Precautions:Standard    Time In: 1300  Time Out: 1400  Total Billable Time: 45 minutes    SUBJECTIVE     Pt reports that he only has IT Band pain when he goes to sleep at night.  Patient reports he has tried a pillow, but gets in bed around 9:00 pm, feels the hip pain around 10:00pm, which then dissipates around 2:00am, and feels like surface pain.  He was compliant with home exercise program.  Response to previous treatment: decreased pain  Functional change: none noted    Pain: 3-4/10 knee and 7-8/10 hip at night in bed  Location: Right knee  TREATMENT     Keith Mccurdy received the following manual therapy techniques: myofascial release of the right piriformis muscle and soft tissue mobilization to the right iliotibial band and quadriceps for 15 minutes.     Keith Mccurdy received therapeutic exercises to develop strength, flexibility and posture for 30 minutes of supervised  "exercise including:    Exercise 11/22/2019 11/29/2019 12/2/2019   Alternating ball/belt isometric   10x10 seconds   Bridges    3x10   Piriformis stretch modified 3x30" x x   Hamstring stretch seated 3x30" x x   Crossover stretch       Knee extension stretch 3' x x   Quad sets 30x x x   Supine heel slides       Ball squeeze 30x x    Standing Hip abduction theraband      Short arc quads      LAQ 3x10 2# x    Glute sets      Kinesio tape 4.5 blocks to right knee x     Standing IT band stretch      Bike   X 10 minutes   Sidelying hip abduction      Straight leg raises 3x8 x      Home Exercises Provided and Patient Education Provided   Educated patient on performing SLRs at home.     Written Home Exercises Provided: Patient instructed to cont prior HEP.  Exercises were reviewed and Keith Mccurdy was able to demonstrate them prior to the end of the session.  Keith Mccurdy demonstrated good  understanding of the education provided.     See EMR under Patient Instructions for exercises provided prior visit.    ASSESSMENT   Patient tolerated posterior strengthening well.  Keith Mccurdy is progressing well towards his goals.   Pt prognosis is Good.     Pt will continue to benefit from skilled outpatient physical therapy to address the deficits listed in the problem list box on initial evaluation, provide pt/family education and to maximize pt's level of independence in the home and community environment.     Pt's spiritual, cultural and educational needs considered and pt agreeable to plan of care and goals.     Anticipated barriers to physical therapy: none    Goals:   Short Term Goals:  3 weeks     1. Pain: Pt will demonstrate improved pain by reports of less than or equal to 6/10 worst pain on the verbal rating scale in order to progress toward maximal functional ability and improve QOL.  Met 11/22/2019   2. Function: Patient will demonstrate improved function as indicated by a score of greater than or equal to 70% on " the Lower Extremity Functional Scale  Progressing, not met 11/22/2019   3. Mobility: Patient will improve AROM to 50% of stated goals, listed in objective measures above, in order to progress towards independence with functional activities.  Progressing, not met 11/22/2019   4. Strength: Patient will improve strength to 50% of stated goals, listed in objective measures above, in order to progress towards independence with functional activities.  Progressing, not met 11/22/2019   5. HEP: Patient will demonstrate independence with HEP in order to progress toward functional independence.  Met 11/22/2019      Long Term Goals:  6 weeks     1. Pain: Pt will demonstrate improved pain by reports of less than or equal to 5/10 worst pain on the verbal rating scale in order to progress toward maximal functional ability and improve QOL.   Met 11/22/2019   2. Function: Patient will demonstrate improved function as indicated by a score of greater than or equal to 72% on the Lower Extremity Functional Scale Progressing, not met 11/22/2019   3. Mobility: Patient will improve AROM to stated goals, listed in objective measures above, in order to return to maximal functional potential and improve quality of life. Progressing, not met 11/22/2019   4. Strength: Patient will improve strength to stated goals, listed in objective measures above, in order to improve functional independence and quality of life. Progressing, not met 11/22/2019   5. Patient will return to normal ADL's, IADL's, community involvement, recreational activities, and work-related activities with less than or equal to 5/10 pain and maximal function.  Met 11/22/2019     PLAN   Plan of care Certification: 10/18/2019 to 12/29/2019.    Continue Plan of Care (POC) and progress per patient tolerance.    Sharona Avitia, PT, DPT, CSCS

## 2019-12-05 NOTE — PLAN OF CARE
Outpatient Therapy Updated Plan of Care     Visit Date: 11/29/2019  Name: Keith Mccurdy  [facility] Number: [id number]    Therapy Diagnosis:   Encounter Diagnoses   Name Primary?    Chronic pain of right knee     Quadriceps muscle strain, right, initial encounter      Physician: Brandin Osullivan MD    Physician Orders: PT Eval and Treat:  core/lumbar program, patellofemoral and quadriceps program with gentle stretching/strengthening, hip abductor/ gluteal strengthening and stretching, gait training; modalities as needed - ok for ultrasound, ionto/phonophoresis, deep heat, manual massage, stim treatment, TENS, pilates/yoga stretching, acupuncture, and other modalities at discretion of therapist, ok for aquatherapy  Medical Diagnosis from Referral: Quadriceps muscle strain, right, sequela (S76.111S), primary osteoarthritis of right knee (M17.11), chronic pain of right knee (M25.561, G89.29)  Evaluation Date: 10/18/2019    Total Visits Received: 11  Cancelled Visits: 0  No Show Visits: 0    Current Certification Period:  10/18/2019 to 11/29/2019  Precautions:  Standard  Visits from Evaluation Date:  11  Functional Level Prior to Evaluation:  Independent with all activities    Subjective     Update: Patient reports his knee no longer limits him with daily functions, but he has pain along the iliotibial band when he lays down at night.    Objective     Update: (x = not tested due to pain and/or inability to obtain test position)     RANGE OF MOTION:     Knee ROM Right  10/18/2019 Left  10/18/2019 Pain/Dysfunction with Movement Goal   Knee Flexion (135) 120 132 Tightness with flexion     Knee Extension (0) 10 0 Pain with extension        STRENGTH:     L/E MMT Right  10/18/2019 Left  10/18/2019 Pain/Dysfunction with Movement Goal   Hip Flexion  4+/5 4+/5   5/5 B    Hip Extension  4+/5 4+/5   5/5 B   Hip Abduction  4+/5 4+/5   5/5 B   Knee Extension 4+/5 5/5   5/5 B   Knee Flexion 5/5 5/5   5/5 B         MUSCLE LENGTH:       Muscle Tested  Right  11/29/2019 Left   10/18/2019 Goal   Hamstrings  normal normal Normal B      FUNCTION:      LOWER EXTREMITY FUNCTIONAL SCALE  Patient-reported functional assessment scores are rated as follows:  A score of 0/4 represents extreme difficulty or unable to perform the activity. A score of 1/4 represents quite a bit of difficulty. A score of 2/4 represents moderate difficulty. A score of 3/4 represents a little bit of difficulty. A score of 4/4 represents no difficulty.                  10/18/2019 11/29/2019   1. Any of your usual work, housework or school activities 4/4 3/4   2. Your usual hobbies, sporting 4/4 1/4 golf   3. Getting in and out of tub 3/4 3/4   4. Walking between rooms 3/4 4/4   5. Putting on shoes or socks 3/4 2/4   6. Squatting 1/4 1/4   7. Lifting an object from the ground             4/4 3/4   8. Performing light activities around the home 4/4 3/4   9. Performing heavy activities around the home 2/4 2/4   10. Getting in and out of car 2/4 2/4   11. Walking 2 blocks 3/4 3/4   12.Walking a mile -/4 N/A   13. Getting up and down 1 flight of stairs 2/4 N/A   14. Standing for 1 hour 1/4 N/A   15. Sitting for an hour             2/4 N/A   16. Running on even ground             2/4 N/A   17. Running on uneven ground 2/4 N/A   18. Making sharp turns when running fast -/4 N/A   19. Hopping             2/4 1/4   20. Rolling over in bed 2/4 1/4                             Patient reports 53.85% (initially 63.89%) ability based on score of the Lower Extremity Functional Scale on 11/29/2019.      Assessment     Update:     Short Term Goals:  3 weeks     1. Pain: Pt will demonstrate improved pain by reports of less than or equal to 6/10 worst pain on the verbal rating scale in order to progress toward maximal functional ability and improve QOL.  Met 11/22/2019   2. Function: Patient will demonstrate improved function as indicated by a score of greater than or equal to 70% on the Lower  Extremity Functional Scale Progressing, not met 11/29/2019   3. Mobility: Patient will improve AROM to 50% of stated goals, listed in objective measures above, in order to progress towards independence with functional activities.  Progressing, not met 11/22/2019   4. Strength: Patient will improve strength to 50% of stated goals, listed in objective measures above, in order to progress towards independence with functional activities.  Met 11/29/2019   5. HEP: Patient will demonstrate independence with HEP in order to progress toward functional independence.  Met 11/22/2019      Long Term Goals:  6 weeks     1. Pain: Pt will demonstrate improved pain by reports of less than or equal to 5/10 worst pain on the verbal rating scale in order to progress toward maximal functional ability and improve QOL.   Met 11/22/2019   2. Function: Patient will demonstrate improved function as indicated by a score of greater than or equal to 72% on the Lower Extremity Functional Scale Progressing, not met 11/29/2019   3. Mobility: Patient will improve AROM to stated goals, listed in objective measures above, in order to return to maximal functional potential and improve quality of life. Progressing, not met 11/29/2019   4. Strength: Patient will improve strength to stated goals, listed in objective measures above, in order to improve functional independence and quality of life. Progressing, not met 11/29/2019   5. Patient will return to normal ADL's, IADL's, community involvement, recreational activities, and work-related activities with less than or equal to 5/10 pain and maximal function.  Met 11/22/2019        Reasons for Recertification of Therapy:   Patient is progressing towards achieving his goals, but has not met them.    Plan     Updated Certification Period: 11/29/2019 to 12/29/2019  Recommended Treatment Plan: 2 times per week for 4 weeks: Electrical Stimulation as needed, Manual Therapy, Moist Heat/ Ice, Neuromuscular  Re-ed, Patient Education, Self Care, Therapeutic Activites and Therapeutic Exercise      Sharona Avitia, PT, DPT, CSCS  11/29/2019      I CERTIFY THE NEED FOR THESE SERVICES FURNISHED UNDER THIS PLAN OF TREATMENT AND WHILE UNDER MY CARE    Physician's comments:        Physician's Signature: ___________________________________________________

## 2019-12-06 ENCOUNTER — CLINICAL SUPPORT (OUTPATIENT)
Dept: REHABILITATION | Facility: HOSPITAL | Age: 74
End: 2019-12-06
Payer: MEDICARE

## 2019-12-06 DIAGNOSIS — M25.561 CHRONIC PAIN OF RIGHT KNEE: ICD-10-CM

## 2019-12-06 DIAGNOSIS — S76.111A QUADRICEPS MUSCLE STRAIN, RIGHT, INITIAL ENCOUNTER: ICD-10-CM

## 2019-12-06 DIAGNOSIS — G89.29 CHRONIC PAIN OF RIGHT KNEE: ICD-10-CM

## 2019-12-06 PROCEDURE — 97110 THERAPEUTIC EXERCISES: CPT | Performed by: PHYSICAL THERAPIST

## 2019-12-06 PROCEDURE — 97140 MANUAL THERAPY 1/> REGIONS: CPT | Performed by: PHYSICAL THERAPIST

## 2019-12-06 NOTE — PROGRESS NOTES
"  Physical Therapy Daily Treatment Note     Name: Keith Mccurdy  Clinic Number: [id number]    Therapy Diagnosis:   Encounter Diagnoses   Name Primary?    Chronic pain of right knee     Quadriceps muscle strain, right, initial encounter      Physician: Brandin Osullivan MD    Visit Date: 12/6/2019    Physician Orders: PT Eval and Treat:  core/lumbar program, patellofemoral and quadriceps program with gentle stretching/strengthening, hip abductor/ gluteal strengthening and stretching, gait training; modalities as needed - ok for ultrasound, ionto/phonophoresis, deep heat, manual massage, stim treatment, TENS, pilates/yoga stretching, acupuncture, and other modalities at discretion of therapist, ok for aquatherapy  Medical Diagnosis from Referral: Quadriceps muscle strain, right, sequela (S76.111S), primary osteoarthritis of right knee (M17.11), chronic pain of right knee (M25.561, G89.29)  Evaluation Date: 10/18/2019  Authorization Period Expiration: 12/29/2019  Plan of Care Expiration: 12/29/2019  Visit # / Visits authorized: 12/ 20    Precautions:Standard    Time In: 930  Time Out: 1030  Total Billable Time: 45 minutes    SUBJECTIVE     Pt reports that he has felt better since last visit.  He was compliant with home exercise program.  Response to previous treatment: decreased pain  Functional change: none noted    Pain: 0/10 knee and 7-8/10 hip at night in bed  Location: Right knee  TREATMENT     Keith Mccurdy received the following manual therapy techniques: myofascial release of the right piriformis muscle and soft tissue mobilization to the right iliotibial band and quadriceps for 15 minutes.     Keith Mccurdy received therapeutic exercises to develop strength, flexibility and posture for 30 minutes of supervised exercise including:    Exercise 11/22/2019 11/29/2019 12/2/2019 12/6/2019   Alternating ball/belt isometric   10x10 seconds x   Bridges    3x10 x   Piriformis stretch modified 3x30" x x x   Hamstring " "stretch seated 3x30" x x x   Crossover stretch        Knee extension stretch 3' x x x   Quad sets 30x x x x   Supine heel slides        Ball squeeze 30x x     Standing Hip abduction theraband       Short arc quads       LAQ 3x10 2# x     Glute sets       Kinesio tape 4.5 blocks to right knee x      Standing IT band stretch       Bike   X 10 minutes x   Sidelying hip abduction       Straight leg raises 3x8 x       Home Exercises Provided and Patient Education Provided   Educated patient on performing SLRs at home.     Written Home Exercises Provided: Patient instructed to cont prior HEP.  Exercises were reviewed and Keith Mccurdy was able to demonstrate them prior to the end of the session.  Keith Mccurdy demonstrated good  understanding of the education provided.     See EMR under Patient Instructions for exercises provided prior visit.    ASSESSMENT   Patient tolerated posterior strengthening well today and had minimal adhesions along the iliotibial band.  Noted hypertonic right piriformis, which normalized with manual therapy.  Keith Mccurdy is progressing well towards his goals.   Pt prognosis is Good.     Pt will continue to benefit from skilled outpatient physical therapy to address the deficits listed in the problem list box on initial evaluation, provide pt/family education and to maximize pt's level of independence in the home and community environment.     Pt's spiritual, cultural and educational needs considered and pt agreeable to plan of care and goals.     Anticipated barriers to physical therapy: none    Goals:   Short Term Goals:  3 weeks     1. Pain: Pt will demonstrate improved pain by reports of less than or equal to 6/10 worst pain on the verbal rating scale in order to progress toward maximal functional ability and improve QOL.  Met 11/22/2019   2. Function: Patient will demonstrate improved function as indicated by a score of greater than or equal to 70% on the Lower Extremity Functional " Scale  Progressing, not met 11/22/2019   3. Mobility: Patient will improve AROM to 50% of stated goals, listed in objective measures above, in order to progress towards independence with functional activities.  Progressing, not met 11/22/2019   4. Strength: Patient will improve strength to 50% of stated goals, listed in objective measures above, in order to progress towards independence with functional activities.  Progressing, not met 11/22/2019   5. HEP: Patient will demonstrate independence with HEP in order to progress toward functional independence.  Met 11/22/2019      Long Term Goals:  6 weeks     1. Pain: Pt will demonstrate improved pain by reports of less than or equal to 5/10 worst pain on the verbal rating scale in order to progress toward maximal functional ability and improve QOL.   Met 11/22/2019   2. Function: Patient will demonstrate improved function as indicated by a score of greater than or equal to 72% on the Lower Extremity Functional Scale Progressing, not met 11/22/2019   3. Mobility: Patient will improve AROM to stated goals, listed in objective measures above, in order to return to maximal functional potential and improve quality of life. Progressing, not met 11/22/2019   4. Strength: Patient will improve strength to stated goals, listed in objective measures above, in order to improve functional independence and quality of life. Progressing, not met 11/22/2019   5. Patient will return to normal ADL's, IADL's, community involvement, recreational activities, and work-related activities with less than or equal to 5/10 pain and maximal function.  Met 11/22/2019     PLAN   Plan of care Certification: 10/18/2019 to 12/29/2019.    Continue Plan of Care (POC) and progress per patient tolerance.    Sharona Avitia, PT, DPT, CSCS

## 2019-12-10 ENCOUNTER — CLINICAL SUPPORT (OUTPATIENT)
Dept: REHABILITATION | Facility: HOSPITAL | Age: 74
End: 2019-12-10
Payer: MEDICARE

## 2019-12-10 DIAGNOSIS — G89.29 CHRONIC PAIN OF RIGHT KNEE: ICD-10-CM

## 2019-12-10 DIAGNOSIS — S76.111A QUADRICEPS MUSCLE STRAIN, RIGHT, INITIAL ENCOUNTER: ICD-10-CM

## 2019-12-10 DIAGNOSIS — M25.561 CHRONIC PAIN OF RIGHT KNEE: ICD-10-CM

## 2019-12-10 PROCEDURE — 97110 THERAPEUTIC EXERCISES: CPT | Performed by: PHYSICAL THERAPIST

## 2019-12-10 PROCEDURE — 97140 MANUAL THERAPY 1/> REGIONS: CPT | Performed by: PHYSICAL THERAPIST

## 2019-12-10 NOTE — PROGRESS NOTES
Physical Therapy Daily Treatment Note     Name: Keith Mccurdy  Clinic Number: [id number]    Therapy Diagnosis:   Encounter Diagnoses   Name Primary?    Chronic pain of right knee     Quadriceps muscle strain, right, initial encounter      Physician: Brandin Osullivan MD    Visit Date: 12/10/2019    Physician Orders: PT Eval and Treat:  core/lumbar program, patellofemoral and quadriceps program with gentle stretching/strengthening, hip abductor/ gluteal strengthening and stretching, gait training; modalities as needed - ok for ultrasound, ionto/phonophoresis, deep heat, manual massage, stim treatment, TENS, pilates/yoga stretching, acupuncture, and other modalities at discretion of therapist, ok for aquatherapy  Medical Diagnosis from Referral: Quadriceps muscle strain, right, sequela (S76.111S), primary osteoarthritis of right knee (M17.11), chronic pain of right knee (M25.561, G89.29)  Evaluation Date: 10/18/2019  Authorization Period Expiration: 12/29/2019  Plan of Care Expiration: 12/29/2019  Visit # / Visits authorized: 12/ 20    Precautions:Standard    Time In: 930  Time Out: 1030  Total Billable Time: 45 minutes    SUBJECTIVE     Pt reports that he has felt better since last visit.  He was compliant with home exercise program.  Response to previous treatment: decreased pain  Functional change: none noted    Pain: 0/10 knee and 7-8/10 hip at night in bed  Location: Right knee  TREATMENT     Keith Mccurdy received the following manual therapy techniques: myofascial release of the right tensor fascia ranulfo, piriformis muscle, and soft tissue mobilization to the right iliotibial band and quadriceps for 15 minutes.     Keith Mccurdy received therapeutic exercises to develop strength, flexibility and posture for 30 minutes of supervised exercise including:    Exercise 11/22/2019 11/29/2019 12/2/2019 12/6/2019 12/10/2019   Alternating ball/belt isometric   10x10 seconds x x   Bridges    3x10 x With yellow theraband  "  Piriformis stretch modified 3x30" x x x x   Hamstring stretch seated 3x30" x x x x   Crossover stretch         Knee extension stretch 3' x x x x   Quad sets 30x x x x    Supine heel slides         Ball squeeze 30x x      Standing Hip abduction theraband        Short arc quads        LAQ 3x10 2# x      Glute sets        Kinesio tape 4.5 blocks to right knee x       Standing IT band stretch        Bike   X 10 minutes x x   Sidelying clams with yellow theraband     3 x 10   Sidelying hip abduction     3 x 10   Prone hip extension     3 x 10   Straight leg raises 3x8 x        Home Exercises Provided and Patient Education Provided   Educated patient on performing SLRs at home.     Written Home Exercises Provided: Patient instructed to cont prior HEP.  Exercises were reviewed and Keith Mccurdy was able to demonstrate them prior to the end of the session.  Keith Mccurdy demonstrated good  understanding of the education provided.     See EMR under Patient Instructions for exercises provided prior visit.    ASSESSMENT   Patient tolerated posterior strengthening well today and had minimal adhesions along the iliotibial band.  Noted hypertonic right tensor fascia ranulfo, which normalized with manual therapy.  Keith Mccurdy is progressing well towards his goals.   Pt prognosis is Good.     Pt will continue to benefit from skilled outpatient physical therapy to address the deficits listed in the problem list box on initial evaluation, provide pt/family education and to maximize pt's level of independence in the home and community environment.     Pt's spiritual, cultural and educational needs considered and pt agreeable to plan of care and goals.     Anticipated barriers to physical therapy: none    Goals:   Short Term Goals:  3 weeks     1. Pain: Pt will demonstrate improved pain by reports of less than or equal to 6/10 worst pain on the verbal rating scale in order to progress toward maximal functional ability and improve " QOL.  Met 11/22/2019   2. Function: Patient will demonstrate improved function as indicated by a score of greater than or equal to 70% on the Lower Extremity Functional Scale  Progressing, not met 11/22/2019   3. Mobility: Patient will improve AROM to 50% of stated goals, listed in objective measures above, in order to progress towards independence with functional activities.  Progressing, not met 11/22/2019   4. Strength: Patient will improve strength to 50% of stated goals, listed in objective measures above, in order to progress towards independence with functional activities.  Progressing, not met 11/22/2019   5. HEP: Patient will demonstrate independence with HEP in order to progress toward functional independence.  Met 11/22/2019      Long Term Goals:  6 weeks     1. Pain: Pt will demonstrate improved pain by reports of less than or equal to 5/10 worst pain on the verbal rating scale in order to progress toward maximal functional ability and improve QOL.   Met 11/22/2019   2. Function: Patient will demonstrate improved function as indicated by a score of greater than or equal to 72% on the Lower Extremity Functional Scale Progressing, not met 11/22/2019   3. Mobility: Patient will improve AROM to stated goals, listed in objective measures above, in order to return to maximal functional potential and improve quality of life. Progressing, not met 11/22/2019   4. Strength: Patient will improve strength to stated goals, listed in objective measures above, in order to improve functional independence and quality of life. Progressing, not met 11/22/2019   5. Patient will return to normal ADL's, IADL's, community involvement, recreational activities, and work-related activities with less than or equal to 5/10 pain and maximal function.  Met 11/22/2019     PLAN   Plan of care Certification: 10/18/2019 to 12/29/2019.    Continue Plan of Care (POC) and progress per patient tolerance.    Sharona Avitia, PT, DPT,  CSCS

## 2019-12-12 ENCOUNTER — CLINICAL SUPPORT (OUTPATIENT)
Dept: REHABILITATION | Facility: HOSPITAL | Age: 74
End: 2019-12-12
Payer: MEDICARE

## 2019-12-12 DIAGNOSIS — G89.29 CHRONIC PAIN OF RIGHT KNEE: ICD-10-CM

## 2019-12-12 DIAGNOSIS — M25.561 CHRONIC PAIN OF RIGHT KNEE: ICD-10-CM

## 2019-12-12 DIAGNOSIS — S76.111A QUADRICEPS MUSCLE STRAIN, RIGHT, INITIAL ENCOUNTER: ICD-10-CM

## 2019-12-12 PROCEDURE — 97110 THERAPEUTIC EXERCISES: CPT | Performed by: PHYSICAL THERAPIST

## 2019-12-12 PROCEDURE — 97140 MANUAL THERAPY 1/> REGIONS: CPT | Performed by: PHYSICAL THERAPIST

## 2019-12-12 NOTE — PROGRESS NOTES
"  Physical Therapy Daily Treatment Note     Name: Keith Mccurdy  Clinic Number: [id number]    Therapy Diagnosis:   Encounter Diagnoses   Name Primary?    Chronic pain of right knee     Quadriceps muscle strain, right, initial encounter      Physician: Brandin Osullivan MD    Visit Date: 12/12/2019    Physician Orders: PT Eval and Treat:  core/lumbar program, patellofemoral and quadriceps program with gentle stretching/strengthening, hip abductor/ gluteal strengthening and stretching, gait training; modalities as needed - ok for ultrasound, ionto/phonophoresis, deep heat, manual massage, stim treatment, TENS, pilates/yoga stretching, acupuncture, and other modalities at discretion of therapist, ok for aquatherapy  Medical Diagnosis from Referral: Quadriceps muscle strain, right, sequela (S76.111S), primary osteoarthritis of right knee (M17.11), chronic pain of right knee (M25.561, G89.29)  Evaluation Date: 10/18/2019  Authorization Period Expiration: 12/29/2019  Plan of Care Expiration: 12/29/2019  Visit # / Visits authorized: 13/ 20    Precautions:Standard    Time In: 930  Time Out: 1040  Total Billable Time: 25 minutes    SUBJECTIVE     Pt reports that he is the same, but that he has decreased pain with using his wife's knee to massage his right piriformis.  He was compliant with home exercise program.  Response to previous treatment: decreased pain  Functional change: none noted    Pain: 0/10 knee and 7-8/10 hip at night in bed  Location: Right knee  TREATMENT     Keith Mccurdy received therapeutic exercises to develop strength, flexibility and posture for 15 minutes of supervised exercise with 15 minutes of exercise with the physical therapist including the following:    Exercise 11/22/2019 11/29/2019 12/2/2019 12/6/2019 12/10/2019 12/12/2019   Alternating ball/belt isometric   10x10 seconds x x x   Bridges    3x10 x With yellow theraband With red theraband   Piriformis stretch modified 3x30" x x x x x " "  Hamstring stretch seated 3x30" x x x x x   Crossover stretch          Knee extension stretch 3' x x x x x   Quad sets 30x x x x     Supine heel slides          Ball squeeze 30x x       Standing Hip abduction theraband         Short arc quads         LAQ 3x10 2# x       Glute sets         Kinesio tape 4.5 blocks to right knee x        Standing IT band stretch         Bike   X 10 minutes x x x   Sidelying clams with yellow theraband     3 x 10 X with red theraband   Sidelying hip abduction     3 x 10 x   Prone hip extension     3 x 10 x   Prone quadriceps stretch      3x30" each   Straight leg raises 3x8 x         Keith Mccurdy received the following manual therapy techniques: myofascial release of the right tensor fascia ranulfo, piriformis muscle, and soft tissue mobilization to the right iliotibial band and quadriceps for 15 minutes.     Home Exercises Provided and Patient Education Provided   Educated patient on performing SLRs at home.     Written Home Exercises Provided: Patient instructed to cont prior HEP.  Exercises were reviewed and Keith Mccurdy was able to demonstrate them prior to the end of the session.  Keith Mccurdy demonstrated good  understanding of the education provided.     See EMR under Patient Instructions for exercises provided prior visit.    ASSESSMENT   Noted hypertonicity in the right piriformis that reduced some with manual.  Educated patient on self release of the piriformis using a tennis ball for home use.    Keith Mccurdy is progressing well towards his goals.   Pt prognosis is Good.     Pt will continue to benefit from skilled outpatient physical therapy to address the deficits listed in the problem list box on initial evaluation, provide pt/family education and to maximize pt's level of independence in the home and community environment.     Pt's spiritual, cultural and educational needs considered and pt agreeable to plan of care and goals.     Anticipated barriers to physical " therapy: none    Goals:   Short Term Goals:  3 weeks     1. Pain: Pt will demonstrate improved pain by reports of less than or equal to 6/10 worst pain on the verbal rating scale in order to progress toward maximal functional ability and improve QOL.  Met 11/22/2019   2. Function: Patient will demonstrate improved function as indicated by a score of greater than or equal to 70% on the Lower Extremity Functional Scale  Progressing, not met 11/22/2019   3. Mobility: Patient will improve AROM to 50% of stated goals, listed in objective measures above, in order to progress towards independence with functional activities.  Progressing, not met 11/22/2019   4. Strength: Patient will improve strength to 50% of stated goals, listed in objective measures above, in order to progress towards independence with functional activities.  Progressing, not met 11/22/2019   5. HEP: Patient will demonstrate independence with HEP in order to progress toward functional independence.  Met 11/22/2019      Long Term Goals:  6 weeks     1. Pain: Pt will demonstrate improved pain by reports of less than or equal to 5/10 worst pain on the verbal rating scale in order to progress toward maximal functional ability and improve QOL.   Met 11/22/2019   2. Function: Patient will demonstrate improved function as indicated by a score of greater than or equal to 72% on the Lower Extremity Functional Scale Progressing, not met 11/22/2019   3. Mobility: Patient will improve AROM to stated goals, listed in objective measures above, in order to return to maximal functional potential and improve quality of life. Progressing, not met 11/22/2019   4. Strength: Patient will improve strength to stated goals, listed in objective measures above, in order to improve functional independence and quality of life. Progressing, not met 11/22/2019   5. Patient will return to normal ADL's, IADL's, community involvement, recreational activities, and work-related  activities with less than or equal to 5/10 pain and maximal function.  Met 11/22/2019     PLAN   Plan of care Certification: 10/18/2019 to 12/29/2019.    Continue Plan of Care (POC) and progress per patient tolerance.    Sharona Avitia, PT, DPT, CSCS

## 2019-12-13 ENCOUNTER — OFFICE VISIT (OUTPATIENT)
Dept: PHYSICAL MEDICINE AND REHAB | Facility: CLINIC | Age: 74
End: 2019-12-13
Payer: MEDICARE

## 2019-12-13 VITALS
DIASTOLIC BLOOD PRESSURE: 84 MMHG | HEART RATE: 88 BPM | WEIGHT: 195 LBS | SYSTOLIC BLOOD PRESSURE: 137 MMHG | RESPIRATION RATE: 12 BRPM | BODY MASS INDEX: 25.84 KG/M2 | HEIGHT: 73 IN

## 2019-12-13 DIAGNOSIS — G57.01 PIRIFORMIS SYNDROME OF RIGHT SIDE: Primary | ICD-10-CM

## 2019-12-13 DIAGNOSIS — M79.18 DIFFUSE MYOFASCIAL PAIN SYNDROME: ICD-10-CM

## 2019-12-13 DIAGNOSIS — M70.71 BURSITIS OF OTHER BURSA OF RIGHT HIP: ICD-10-CM

## 2019-12-13 PROBLEM — H34.8112 STABLE CENTRAL RETINAL VEIN OCCLUSION OF RIGHT EYE: Status: ACTIVE | Noted: 2019-09-24

## 2019-12-13 PROBLEM — H83.2X1 VESTIBULAR HYPOFUNCTION OF RIGHT EAR: Status: ACTIVE | Noted: 2018-11-06

## 2019-12-13 PROBLEM — G51.0 RIGHT-SIDED BELL'S PALSY: Status: ACTIVE | Noted: 2018-11-06

## 2019-12-13 PROBLEM — H33.101: Status: ACTIVE | Noted: 2019-09-24

## 2019-12-13 PROBLEM — H90.3 HEARING LOSS, SENSORINEURAL, ASYMMETRICAL: Status: ACTIVE | Noted: 2018-11-06

## 2019-12-13 PROCEDURE — 1159F PR MEDICATION LIST DOCUMENTED IN MEDICAL RECORD: ICD-10-PCS | Mod: ,,, | Performed by: PHYSICAL MEDICINE & REHABILITATION

## 2019-12-13 PROCEDURE — 99999 PR PBB SHADOW E&M-EST. PATIENT-LVL III: ICD-10-PCS | Mod: PBBFAC,,, | Performed by: PHYSICAL MEDICINE & REHABILITATION

## 2019-12-13 PROCEDURE — 99204 OFFICE O/P NEW MOD 45 MIN: CPT | Mod: 25,S$PBB,, | Performed by: PHYSICAL MEDICINE & REHABILITATION

## 2019-12-13 PROCEDURE — 99213 OFFICE O/P EST LOW 20 MIN: CPT | Mod: PBBFAC | Performed by: PHYSICAL MEDICINE & REHABILITATION

## 2019-12-13 PROCEDURE — 20552 NJX 1/MLT TRIGGER POINT 1/2: CPT | Mod: S$PBB,,, | Performed by: PHYSICAL MEDICINE & REHABILITATION

## 2019-12-13 PROCEDURE — 20552 NJX 1/MLT TRIGGER POINT 1/2: CPT | Mod: PBBFAC | Performed by: PHYSICAL MEDICINE & REHABILITATION

## 2019-12-13 PROCEDURE — 20552 PR INJECT TRIGGER POINT, 1 OR 2: ICD-10-PCS | Mod: S$PBB,,, | Performed by: PHYSICAL MEDICINE & REHABILITATION

## 2019-12-13 PROCEDURE — 99999 PR PBB SHADOW E&M-EST. PATIENT-LVL III: CPT | Mod: PBBFAC,,, | Performed by: PHYSICAL MEDICINE & REHABILITATION

## 2019-12-13 PROCEDURE — 99204 PR OFFICE/OUTPT VISIT, NEW, LEVL IV, 45-59 MIN: ICD-10-PCS | Mod: 25,S$PBB,, | Performed by: PHYSICAL MEDICINE & REHABILITATION

## 2019-12-13 PROCEDURE — 1159F MED LIST DOCD IN RCRD: CPT | Mod: ,,, | Performed by: PHYSICAL MEDICINE & REHABILITATION

## 2019-12-13 RX ORDER — TAFLUPROST OPTHALMIC 0 MG/.3ML
1 SOLUTION/ DROPS OPHTHALMIC
COMMUNITY
Start: 2019-09-24

## 2019-12-13 RX ORDER — IBUPROFEN 600 MG/1
TABLET ORAL EVERY 4 HOURS PRN
COMMUNITY
Start: 2019-09-23

## 2019-12-13 RX ORDER — METHYLPREDNISOLONE ACETATE 40 MG/ML
40 INJECTION, SUSPENSION INTRA-ARTICULAR; INTRALESIONAL; INTRAMUSCULAR; SOFT TISSUE
Status: COMPLETED | OUTPATIENT
Start: 2019-12-13 | End: 2019-12-13

## 2019-12-13 RX ORDER — NEPAFENAC 3 MG/ML
SUSPENSION/ DROPS OPHTHALMIC
Refills: 3 | COMMUNITY
Start: 2019-09-06

## 2019-12-13 RX ADMIN — METHYLPREDNISOLONE ACETATE 40 MG: 40 INJECTION, SUSPENSION INTRA-ARTICULAR; INTRALESIONAL; INTRAMUSCULAR; SOFT TISSUE at 10:12

## 2019-12-13 NOTE — PROGRESS NOTES
PM&[facility] NEW PATIENT HISTORY & PHYSICAL :    Referring Provider: DEEPAK Avitia DPT    Chief Complaint   Patient presents with    Hip Pain     right        HPI: This is a 74 y.o.  male being seen in clinic today for evaluation of chronic piriformis and right leg/hip pain.  He has been in PT here for a few weeks and reports significant improvement of his symptoms due to his physical therapist (Sharona Avitia).  He still has tightness in his piriformis which affects him more with prolonged sitting or inactivity.  Using a tennis ball or applying pressure/stretch to his piriformis helps, but only temporary.  He was recommended by his therapist to try a piriformis injection for prolonged relief.     History obtained from patient    Functional History:  Walking: Not limited  Transfers: Independent  Assistive devices: No  Power mobility: No  Falls: None     Needs help with:  Nothing - all ADLS normal    Cooking   Cleaning  Bathing   Dressing   Toileting     Past family, medical, social, and surgical history reviewed in chart    Review of Systems:     General- denies lethargy, weight change, fever, chills  Head/neck- denies swallowing difficulties  ENT- denies hearing changes  Cardiovascular-denies chest pain  Pulmonary- denies shortness of breath  GI- denies constipation or bowel incontinence  - denies bladder incontinence  Skin- denies wounds or rashes  Musculoskeletal- denies weakness, +pain  Neurologic- denies numbness and tingling  Psychiatric- denies depressive or psychotic features, denies anxiety  Lymphatic-denies swelling  Endocrine- denies hypoglycemic symptoms/DM history  All other pertinent systems negative     Physical Examination:  General: Well developed, well nourished male, NAD  HEENT:NCAT EOMI bilaterally   Pulmonary:Normal respirations    Spinal Examination: CERVICAL  Active ROM is within normal limits.  Inspection: No deformity of spinal alignment.    Spinal Examination: LUMBAR or THORACIC  Active ROM is  limited at full end ranges  Inspection: No deformity of spinal alignment.  No palpable olisthesis.  Palpation: No vertebral tenderness to percussion.  ttp and tight band at right piriformis  Facet loading neg bilaterally  SLR Test (seated and supine):negative  bilaterally  Able to stand on heels and toes    Bilateral Upper and Lower Extremities:  Pulses are 2+ at radial, bilaterally.  Shoulder/Elbow/Wrist/Hand ROM   Hip/Knee/Ankle ROM wnl except limited full ankle dorsiflexion  Bilateral Extremities show normal capillary refill.  No signs of cyanosis, rubor, edema, skin changes, or dysvascular changes of appendages.  Nails appear intact.    Neurological Exam:  Cranial Nerves:  II-XII grossly intact    Manual Muscle Testing: (Motor 5=normal)    RIGHT Upper extremity: Shoulder abduction 5/5, Biceps 5/5, Triceps 5/5, Wrist extension 5/5, Abductor pollicis brevis 5/5, Ulnar hand intrinsics 5/5,  LEFT Upper extremity: Shoulder abduction 5/5, Biceps 5/5, Triceps 5/5, Wrist extension 5/5, Abductor pollicis brevis 5/5, Ulnar hand intrinsics 5/5,  RIGHT Lower extremity: Hip flexion 5/5, Hip Abduction 4+/5, Hip Adduction 5/5, Knee extension 5/5, Knee flexion 5/5, Ankle dorsiflexion 3+/5, Extensor hallucis longus 5/5, Ankle plantarflexion 5/5  LEFT Lower extremity:  Hip flexion 5/5, Hip Abduction 4+/5,Hip Adduction 5/5, Knee extension 5/5, Knee flexion 5/5, Ankle dorsiflexion 3+/5, Extensor hallucis longus 5/5, Ankle plantarflexion 5/5    No focal atrophy is noted of either lower extremity.    Bilateral Reflexes:hypo at patellar  No clonus at knee or ankle.    Sensation: tested to light touch  - intact in legs    Gait: short stride, good arm swing, mild limitation of ankle dorsiflexion    IMPRESSION/PLAN: This is a 74 y.o.  male with right piriformis syndrome    1. Cont PT here with Sharona-focus on lower ext, hip, core strength, modalities  2. Piriformis injection today. Advised to ice afterwards and resume light stretches.  Change position as often as possible  3. Fu prn    Laura Shaikh M.D.  Physical Medicine and Rehab    PROCEDURE NOTE    Diagnosis: Piriformis syndrome  Procedure: Piriformis injection    Risks and benefits of procedure explained to patient including risks of infection, bleeding, pain, or damage to surrounding tissues. All questions answered. Informed consent obtained prior to proceeding. Areas marked and prepped in sterile fashion. Using a 25 G 3.5  inch needle, a 3cc mixture of depomedrol 1cc (40mg)and 1% lidocaine was injected into the piriformis muscle. Minimal to no bleeding noted. ER and post injection instructions given.    Laura Shaikh M.D.

## 2019-12-17 ENCOUNTER — CLINICAL SUPPORT (OUTPATIENT)
Dept: REHABILITATION | Facility: HOSPITAL | Age: 74
End: 2019-12-17
Payer: MEDICARE

## 2019-12-17 DIAGNOSIS — G89.29 CHRONIC PAIN OF RIGHT KNEE: ICD-10-CM

## 2019-12-17 DIAGNOSIS — S76.111A QUADRICEPS MUSCLE STRAIN, RIGHT, INITIAL ENCOUNTER: ICD-10-CM

## 2019-12-17 DIAGNOSIS — M25.561 CHRONIC PAIN OF RIGHT KNEE: ICD-10-CM

## 2019-12-17 PROCEDURE — 97110 THERAPEUTIC EXERCISES: CPT | Performed by: PHYSICAL THERAPIST

## 2019-12-17 PROCEDURE — 97140 MANUAL THERAPY 1/> REGIONS: CPT | Performed by: PHYSICAL THERAPIST

## 2019-12-17 NOTE — PROGRESS NOTES
"  Physical Therapy Daily Treatment Note     Name: Keith Mccurdy  Clinic Number: [id number]    Therapy Diagnosis:   Encounter Diagnoses   Name Primary?    Chronic pain of right knee     Quadriceps muscle strain, right, initial encounter      Physician: Brandin Osullivan MD    Visit Date: 12/17/2019    Physician Orders: PT Eval and Treat:  core/lumbar program, patellofemoral and quadriceps program with gentle stretching/strengthening, hip abductor/ gluteal strengthening and stretching, gait training; modalities as needed - ok for ultrasound, ionto/phonophoresis, deep heat, manual massage, stim treatment, TENS, pilates/yoga stretching, acupuncture, and other modalities at discretion of therapist, ok for aquatherapy  Medical Diagnosis from Referral: Quadriceps muscle strain, right, sequela (S76.111S), primary osteoarthritis of right knee (M17.11), chronic pain of right knee (M25.561, G89.29)  Evaluation Date: 10/18/2019  Authorization Period Expiration: 12/29/2019  Plan of Care Expiration: 12/29/2019  Visit # / Visits authorized: 14/ 20    Precautions:Standard    Time In: 923  Time Out: 1023  Total Billable Time: 30 minutes    SUBJECTIVE     Pt reports he had an injection, which enabled him to travel to [place] and back.  He was compliant with home exercise program.  Response to previous treatment: decreased pain  Functional change: none noted    Pain: 0/10 knee and 7-8/10 hip at night in bed  Location: Right knee  TREATMENT     Keith Mccurdy received therapeutic exercises to develop strength, flexibility and posture for 15 minutes of supervised exercise with 15 minutes of exercise with the physical therapist including the following:    Exercise 11/22/2019 11/29/2019 12/2/2019 12/6/2019 12/10/2019 12/12/2019 12/17/2019   Alternating ball/belt isometric   10x10 seconds x x x    Bridges    3x10 x With yellow theraband With red theraband    Piriformis stretch modified 3x30" x x x x x    Hamstring stretch seated 3x30" x " "x x x x    Crossover stretch           Knee extension stretch 3' x x x x x    Quad sets 30x x x x      Supine heel slides           Ball squeeze 30x x        Standing Hip abduction theraband          Short arc quads          LAQ 3x10 2# x        Glute sets          Kinesio tape 4.5 blocks to right knee x         Standing IT band stretch          Bike   X 10 minutes x x x    Sidelying clams with yellow theraband     3 x 10 X with red theraband    Sidelying hip abduction     3 x 10 x    Prone hip extension     3 x 10 x    Prone quadriceps stretch      3x30" each    Straight leg raises 3x8 x          Keith Mccurdy received the following manual therapy techniques: myofascial release of the right tensor fascia ranulfo, piriformis muscle, and soft tissue mobilization to the right iliotibial band and quadriceps for 15 minutes.     Home Exercises Provided and Patient Education Provided   Educated patient on performing SLRs at home.     Written Home Exercises Provided: Patient instructed to cont prior HEP.  Exercises were reviewed and Keith Mccurdy was able to demonstrate them prior to the end of the session.  Keith Mccurdy demonstrated good  understanding of the education provided.     See EMR under Patient Instructions for exercises provided prior visit.    ASSESSMENT   Noted tightness in the right Iliotibial band and vastus lateralis, which reduced some with manual.  No tone or tenderness noted in the right piriformis today.  Patient tolerated therapeutic exercise well.  Keith Mccurdy is progressing well towards his goals.   Pt prognosis is Good.     Pt will continue to benefit from skilled outpatient physical therapy to address the deficits listed in the problem list box on initial evaluation, provide pt/family education and to maximize pt's level of independence in the home and community environment.     Pt's spiritual, cultural and educational needs considered and pt agreeable to plan of care and goals.   "   Anticipated barriers to physical therapy: none    Goals:   Short Term Goals:  3 weeks     1. Pain: Pt will demonstrate improved pain by reports of less than or equal to 6/10 worst pain on the verbal rating scale in order to progress toward maximal functional ability and improve QOL.  Met 11/22/2019   2. Function: Patient will demonstrate improved function as indicated by a score of greater than or equal to 70% on the Lower Extremity Functional Scale  Progressing, not met 11/22/2019   3. Mobility: Patient will improve AROM to 50% of stated goals, listed in objective measures above, in order to progress towards independence with functional activities.  Progressing, not met 11/22/2019   4. Strength: Patient will improve strength to 50% of stated goals, listed in objective measures above, in order to progress towards independence with functional activities.  Progressing, not met 11/22/2019   5. HEP: Patient will demonstrate independence with HEP in order to progress toward functional independence.  Met 11/22/2019      Long Term Goals:  6 weeks     1. Pain: Pt will demonstrate improved pain by reports of less than or equal to 5/10 worst pain on the verbal rating scale in order to progress toward maximal functional ability and improve QOL.   Met 11/22/2019   2. Function: Patient will demonstrate improved function as indicated by a score of greater than or equal to 72% on the Lower Extremity Functional Scale Progressing, not met 11/22/2019   3. Mobility: Patient will improve AROM to stated goals, listed in objective measures above, in order to return to maximal functional potential and improve quality of life. Progressing, not met 11/22/2019   4. Strength: Patient will improve strength to stated goals, listed in objective measures above, in order to improve functional independence and quality of life. Progressing, not met 11/22/2019   5. Patient will return to normal ADL's, IADL's, community involvement, recreational  activities, and work-related activities with less than or equal to 5/10 pain and maximal function.  Met 11/22/2019     PLAN   Plan of care Certification: 10/18/2019 to 12/29/2019.    Continue Plan of Care (POC) and progress per patient tolerance.    Sharona Avitia, PT, DPT, CSCS

## 2020-01-07 ENCOUNTER — PATIENT MESSAGE (OUTPATIENT)
Dept: REHABILITATION | Facility: HOSPITAL | Age: 75
End: 2020-01-07

## 2020-01-09 DIAGNOSIS — G57.01 PIRIFORMIS SYNDROME, RIGHT: Primary | ICD-10-CM

## 2020-01-14 ENCOUNTER — CLINICAL SUPPORT (OUTPATIENT)
Dept: REHABILITATION | Facility: HOSPITAL | Age: 75
End: 2020-01-14
Payer: MEDICARE

## 2020-01-14 DIAGNOSIS — G89.29 CHRONIC PAIN OF RIGHT KNEE: ICD-10-CM

## 2020-01-14 DIAGNOSIS — S76.111A QUADRICEPS MUSCLE STRAIN, RIGHT, INITIAL ENCOUNTER: ICD-10-CM

## 2020-01-14 DIAGNOSIS — M25.561 CHRONIC PAIN OF RIGHT KNEE: ICD-10-CM

## 2020-01-14 PROCEDURE — 97110 THERAPEUTIC EXERCISES: CPT | Performed by: PHYSICAL THERAPIST

## 2020-01-14 PROCEDURE — 97140 MANUAL THERAPY 1/> REGIONS: CPT | Performed by: PHYSICAL THERAPIST

## 2020-01-14 NOTE — PROGRESS NOTES
"  Physical Therapy Daily Treatment Note     Name: Keith Mccurdy  [facility] Number: [id number]    Therapy Diagnosis:   Encounter Diagnoses   Name Primary?    Chronic pain of right knee     Quadriceps muscle strain, right, initial encounter      Physician: Laura Shaikh MD    Visit Date: 1/14/2020    Physician Orders: PT Eval and Treat:  core/lumbar program, patellofemoral and quadriceps program with gentle stretching/strengthening, hip abductor/ gluteal strengthening and stretching, gait training; modalities as needed - ok for ultrasound, ionto/phonophoresis, deep heat, manual massage, stim treatment, TENS, pilates/yoga stretching, acupuncture, and other modalities at discretion of therapist, ok for aquatherapy  Medical Diagnosis from Referral: Quadriceps muscle strain, right, sequela (S76.111S), primary osteoarthritis of right knee (M17.11), chronic pain of right knee (M25.561, G89.29)  Evaluation Date: 10/18/2019  Authorization Period Expiration: 4/13/2020  Plan of Care Expiration: 4/13/2020  Visit # / Visits authorized: 16/ 20    Precautions:Standard    Time In: 930  Time Out: 1015  Total Billable Time: 45 minutes    SUBJECTIVE     Pt reports he is feeling much better, but that he needs more therapy to get back to "normal."  He was compliant with home exercise program.  Response to previous treatment: decreased pain  Functional change: none noted    Pain: 0/10 knee and 7-8/10 hip at night in bed  Location: Right knee  TREATMENT     Update: (x = not tested due to pain and/or inability to obtain test position)     RANGE OF MOTION:     Knee ROM Right  1/14/2020 Left  1/14/2020 Pain/Dysfunction with Movement Goal   Knee Flexion (135) 120 132      Knee Extension (0) 10 0         STRENGTH:     L/E MMT Right  1/14/2020 Left  1/14/2020 Pain/Dysfunction with Movement Goal   Hip Flexion  5/5 5/5   5/5 B    Hip Extension  4+/5 4+/5   5/5 B   Hip Abduction  5/5 5/5   5/5 B   Knee Extension 5/5 5/5   5/5 B   Knee Flexion 5/5 5/5 "   5/5 B         MUSCLE LENGTH:      Muscle Tested  Right  11/29/2019 Left   10/18/2019 Goal   Hamstrings  normal normal Normal B      FUNCTION:     LOWER EXTREMITY FUNCTIONAL SCALE  Patient-reported functional assessment scores are rated as follows:  A score of 0/4 represents extreme difficulty or unable to perform the activity. A score of 1/4 represents quite a bit of difficulty. A score of 2/4 represents moderate difficulty. A score of 3/4 represents a little bit of difficulty. A score of 4/4 represents no difficulty.       10/18/2019 11/29/2019 1/14/2020   1. Any of your usual work, housework or school activities 4/4 3/4 4/4   2. Your usual hobbies, sporting 4/4 1/4 golf 2/4   3. Getting in and out of tub 3/4 3/4 4/4   4. Walking between rooms 3/4 4/4 4/4   5. Putting on shoes or socks 3/4 2/4 3/4   6. Squatting 1/4 1/4 3/4   7. Lifting an object from the ground             4/4 3/4 4/4   8. Performing light activities around the home 4/4 3/4 4/4   9. Performing heavy activities around the home 2/4 2/4 3/4   10. Getting in and out of car 2/4 2/4 3/4   11. Walking 2 blocks 3/4 3/4 4/4   12.Walking a mile -/4 N/A 4/4   13. Getting up and down 1 flight of stairs 2/4 N/A 3/4   14. Standing for 1 hour 1/4 N/A N/A   15. Sitting for an hour             2/4 N/A 4/4   16. Running on even ground             2/4 N/A 2/4   17. Running on uneven ground 2/4 N/A N/A   18. Making sharp turns when running fast -/4 N/A N/A   19. Hopping             2/4 1/4 3/4   20. Rolling over in bed 2/4 1/4 3/4                             Patient reports 83.82% (initially 63.89%, followed by 53.85%) ability based on score of the Lower Extremity Functional Scale on 1/14/2020.     Keith Mccurdy received therapeutic exercises to develop strength, flexibility and posture for 30 minutes with the physical therapist including the following:    Exercise 11/22/2019 11/29/2019 12/2/2019 12/6/2019 12/10/2019 12/12/2019 12/17/2019 1/20/2020   Alternating  "ball/belt isometric   10x10 seconds x x x x x   Bridges    3x10 x With yellow theraband With red theraband x x   Piriformis stretch modified 3x30" x x x x x x x   Hamstring stretch seated 3x30" x x x x x x x   Crossover stretch            Knee extension stretch 3' x x x x x     Quad sets 30x x x x       Supine heel slides            Ball squeeze 30x x         Standing Hip abduction theraband           Short arc quads           LAQ 3x10 2# x         Glute sets           Kinesio tape 4.5 blocks to right knee x          Standing IT band stretch           Bike   X 10 minutes x x x x x   Sidelying clams with yellow theraband     3 x 10 X with red theraband x X with green theraband   Sidelying hip abduction     3 x 10 x x x   Prone hip extension     3 x 10 x x x   Prone quadriceps stretch      3x30" each x x   Straight leg raises 3x8 x           Keith Mccurdy received the following manual therapy techniques: myofascial release of the right tensor fascia ranulfo, piriformis muscle, and soft tissue mobilization to the right iliotibial band and quadriceps for 15 minutes.     Home Exercises Provided and Patient Education Provided   Educated patient on performing SLRs at home.     Written Home Exercises Provided: Patient instructed to cont prior HEP.  Exercises were reviewed and Keith Mccurdy was able to demonstrate them prior to the end of the session.  Keith Mccurdy demonstrated good understanding of the education provided.     See EMR under Patient Instructions for exercises provided prior visit.    ASSESSMENT   Noted tenderness along the right piriformis, which improved with manual therapy.  Patient tolerated therapeutic exercise well and without complaint of pain.  Keith Mccurdy is progressing well towards his goals.   Pt prognosis is Good.     Pt will continue to benefit from skilled outpatient physical therapy to address the deficits listed in the problem list box on initial evaluation, provide pt/family education and " to maximize pt's level of independence in the home and community environment.     Pt's spiritual, cultural and educational needs considered and pt agreeable to plan of care and goals.     Anticipated barriers to physical therapy: none    Goals:   Short Term Goals:  3 weeks     1. Pain: Pt will demonstrate improved pain by reports of less than or equal to 6/10 worst pain on the verbal rating scale in order to progress toward maximal functional ability and improve QOL.  Met 11/22/2019   2. Function: Patient will demonstrate improved function as indicated by a score of greater than or equal to 70% on the Lower Extremity Functional Scale  Met 1/14/2020   3. Mobility: Patient will improve AROM to 50% of stated goals, listed in objective measures above, in order to progress towards independence with functional activities.  Progressing, not met 1/14/2020   4. Strength: Patient will improve strength to 50% of stated goals, listed in objective measures above, in order to progress towards independence with functional activities.  Progressing, not met 1/14/2020   5. HEP: Patient will demonstrate independence with HEP in order to progress toward functional independence.  Met 11/22/2019      Long Term Goals:  6 weeks     1. Pain: Pt will demonstrate improved pain by reports of less than or equal to 5/10 worst pain on the verbal rating scale in order to progress toward maximal functional ability and improve QOL.   Met 11/22/2019   2. Function: Patient will demonstrate improved function as indicated by a score of greater than or equal to 72% on the Lower Extremity Functional Scale Met 1/14/2020   3. Mobility: Patient will improve AROM to stated goals, listed in objective measures above, in order to return to maximal functional potential and improve quality of life. Progressing, not met 1/14/2020   4. Strength: Patient will improve strength to stated goals, listed in objective measures above, in order to improve functional  independence and quality of life. Progressing, not met 1/14/2020   5. Patient will return to normal ADL's, IADL's, community involvement, recreational activities, and work-related activities with less than or equal to 5/10 pain and maximal function.  Met 11/22/2019     PLAN   Plan of care Certification: 1/14/2020 to 4/13/2020.    Continue Plan of Care (POC) and progress per patient tolerance.    Sharona Avitia, PT, DPT, CSCS

## 2020-01-24 ENCOUNTER — CLINICAL SUPPORT (OUTPATIENT)
Dept: REHABILITATION | Facility: HOSPITAL | Age: 75
End: 2020-01-24
Payer: MEDICARE

## 2020-01-24 DIAGNOSIS — G89.29 CHRONIC PAIN OF RIGHT KNEE: ICD-10-CM

## 2020-01-24 DIAGNOSIS — M25.561 CHRONIC PAIN OF RIGHT KNEE: ICD-10-CM

## 2020-01-24 DIAGNOSIS — S76.111A QUADRICEPS MUSCLE STRAIN, RIGHT, INITIAL ENCOUNTER: ICD-10-CM

## 2020-01-24 PROCEDURE — 97110 THERAPEUTIC EXERCISES: CPT | Performed by: PHYSICAL THERAPIST

## 2020-01-24 PROCEDURE — 97140 MANUAL THERAPY 1/> REGIONS: CPT | Performed by: PHYSICAL THERAPIST

## 2020-01-24 NOTE — PROGRESS NOTES
Physical Therapy Daily Treatment Note     Name: Keith Mccurdy  Clinic Number: [id number]    Therapy Diagnosis:   Encounter Diagnoses   Name Primary?    Chronic pain of right knee     Quadriceps muscle strain, right, initial encounter      Physician: Laura Shaikh MD    Visit Date: 1/24/2020    Physician Orders: PT Eval and Treat:  core/lumbar program, patellofemoral and quadriceps program with gentle stretching/strengthening, hip abductor/ gluteal strengthening and stretching, gait training; modalities as needed - ok for ultrasound, ionto/phonophoresis, deep heat, manual massage, stim treatment, TENS, pilates/yoga stretching, acupuncture, and other modalities at discretion of therapist, ok for aquatherapy  Medical Diagnosis from Referral: Quadriceps muscle strain, right, sequela (S76.111S), primary osteoarthritis of right knee (M17.11), chronic pain of right knee (M25.561, G89.29)  Evaluation Date: 10/18/2019  Authorization Period Expiration: 4/13/2020  Plan of Care Expiration: 4/13/2020  Visit # / Visits authorized: 17/ 20    Precautions:Standard    Time In: 8:00am  Time Out: 8:40am  Total Billable Time: 40 minutes    SUBJECTIVE     Pt reports that he is feeling pretty good today. He wants to begin working out at the [employer] again starting at the first of the month. Patient would like to continue coming in for the next couple of weeks until he begins working out at the gym, and he will then follow up as necessary to make sure he is making good improvements.     He was compliant with home exercise program.  Response to previous treatment: decreased pain  Functional change: none noted    Pain: 0/10 knee and 7-8/10 hip at night in bed  Location: Right knee    TREATMENT        Keith Mccurdy received therapeutic exercises to develop strength, flexibility and posture for 30 minutes with the physical therapist including the following:    Exercise 11/22/2019 11/29/2019 12/2/2019 12/6/2019 12/10/2019 12/12/2019 12/17/2019  "1/20/2020 1/24/2020   Alternating ball/belt isometric   10x10 seconds x x x x x x   Bridges    3x10 x With yellow theraband With red theraband x x x   Piriformis stretch modified 3x30" x x x x x x x x   Hamstring stretch seated 3x30" x x x x x x x x   Crossover stretch             Knee extension stretch 3' x x x x x      Quad sets 30x x x x        Supine heel slides             Ball squeeze 30x x          Standing Hip abduction theraband            Short arc quads            LAQ 3x10 2# x          Glute sets            Kinesio tape 4.5 blocks to right knee x           Standing IT band stretch            Bike   X 10 minutes x x x x x x   Sidelying clams with yellow theraband     3 x 10 X with red theraband x X with green theraband x   Sidelying hip abduction     3 x 10 x x x x   Prone hip extension     3 x 10 x x x x   Prone quadriceps stretch      3x30" each x x x   Straight leg raises 3x8 x            Keith Mccurdy received the following manual therapy techniques: myofascial release of the right tensor fascia ranulfo, piriformis muscle, and soft tissue mobilization to the right iliotibial band and quadriceps for 15 minutes. - deferred    Keith Mccurdy received patient education for 10 minutes:  Plan discussed with patient, daughter, primary PT - Sharona Avitia, PT, DPT, and MD. Discussed transition to gym, keeping up with HEP, and future PT visits.     Home Exercises Provided and Patient Education Provided   Educated patient on performing SLRs at home.     Written Home Exercises Provided: Patient instructed to cont prior HEP.  Exercises were reviewed and Keith Mccurdy was able to demonstrate them prior to the end of the session.  Keith Mccurdy demonstrated good understanding of the education provided.     See EMR under Patient Instructions for exercises provided prior visit.    ASSESSMENT     Patient tolerated treatment well. He completed exercises without complaint today. Patient demonstrates glute med weakness " with s/l abd, and he required tactile cues to maintain proper form. He is appropriate to transition to the gym but follow up with PT to make sure that pain levels do not increase. Patient is progressing well towards goals.     Keith Mccurdy is progressing well towards his goals.   Pt prognosis is Good.     Pt will continue to benefit from skilled outpatient physical therapy to address the deficits listed in the problem list box on initial evaluation, provide pt/family education and to maximize pt's level of independence in the home and community environment.     Pt's spiritual, cultural and educational needs considered and pt agreeable to plan of care and goals.     Anticipated barriers to physical therapy: none    Goals:   Short Term Goals:  3 weeks     1. Pain: Pt will demonstrate improved pain by reports of less than or equal to 6/10 worst pain on the verbal rating scale in order to progress toward maximal functional ability and improve QOL.  Met 11/22/2019   2. Function: Patient will demonstrate improved function as indicated by a score of greater than or equal to 70% on the Lower Extremity Functional Scale  Met 1/14/2020   3. Mobility: Patient will improve AROM to 50% of stated goals, listed in objective measures above, in order to progress towards independence with functional activities.  Progressing, not met 1/14/2020   4. Strength: Patient will improve strength to 50% of stated goals, listed in objective measures above, in order to progress towards independence with functional activities.  Progressing, not met 1/14/2020   5. HEP: Patient will demonstrate independence with HEP in order to progress toward functional independence.  Met 11/22/2019      Long Term Goals:  6 weeks     1. Pain: Pt will demonstrate improved pain by reports of less than or equal to 5/10 worst pain on the verbal rating scale in order to progress toward maximal functional ability and improve QOL.   Met 11/22/2019   2. Function: Patient  will demonstrate improved function as indicated by a score of greater than or equal to 72% on the Lower Extremity Functional Scale Met 1/14/2020   3. Mobility: Patient will improve AROM to stated goals, listed in objective measures above, in order to return to maximal functional potential and improve quality of life. Progressing, not met 1/14/2020   4. Strength: Patient will improve strength to stated goals, listed in objective measures above, in order to improve functional independence and quality of life. Progressing, not met 1/14/2020   5. Patient will return to normal ADL's, IADL's, community involvement, recreational activities, and work-related activities with less than or equal to 5/10 pain and maximal function.  Met 11/22/2019     PLAN   Plan of care Certification: 1/14/2020 to 4/13/2020.    Continue Plan of Care (POC) and progress per patient tolerance.    Paula Bermudez, PT, DPT, CSCS

## 2020-01-28 ENCOUNTER — CLINICAL SUPPORT (OUTPATIENT)
Dept: REHABILITATION | Facility: HOSPITAL | Age: 75
End: 2020-01-28
Payer: MEDICARE

## 2020-01-28 DIAGNOSIS — M25.561 CHRONIC PAIN OF RIGHT KNEE: ICD-10-CM

## 2020-01-28 DIAGNOSIS — G89.29 CHRONIC PAIN OF RIGHT KNEE: ICD-10-CM

## 2020-01-28 DIAGNOSIS — S76.111A QUADRICEPS MUSCLE STRAIN, RIGHT, INITIAL ENCOUNTER: ICD-10-CM

## 2020-01-28 PROCEDURE — 97110 THERAPEUTIC EXERCISES: CPT | Performed by: PHYSICAL THERAPIST

## 2020-01-28 PROCEDURE — 97140 MANUAL THERAPY 1/> REGIONS: CPT | Performed by: PHYSICAL THERAPIST

## 2020-01-28 NOTE — PROGRESS NOTES
"  Physical Therapy Daily Treatment Note     Name: Keith Mccurdy  Clinic Number: [id number]    Therapy Diagnosis:   Encounter Diagnoses   Name Primary?    Chronic pain of right knee     Quadriceps muscle strain, right, initial encounter      Physician: Laura Shaikh MD    Visit Date: 1/28/2020    Physician Orders: PT Eval and Treat:  core/lumbar program, patellofemoral and quadriceps program with gentle stretching/strengthening, hip abductor/ gluteal strengthening and stretching, gait training; modalities as needed - ok for ultrasound, ionto/phonophoresis, deep heat, manual massage, stim treatment, TENS, pilates/yoga stretching, acupuncture, and other modalities at discretion of therapist, ok for aquatherapy  Medical Diagnosis from Referral: Quadriceps muscle strain, right, sequela (S76.111S), primary osteoarthritis of right knee (M17.11), chronic pain of right knee (M25.561, G89.29)  Evaluation Date: 10/18/2019  Authorization Period Expiration: 4/13/2020  Plan of Care Expiration: 4/13/2020  Visit # / Visits authorized: 18/ 20    Precautions:Standard    Time In: 9:05am  Time Out: 9:55am  Total Billable Time: 50 minutes    SUBJECTIVE     Pt reports feeling good today. He has a little soreness in his lateral thigh, but nothing too bad.     He was compliant with home exercise program.  Response to previous treatment: decreased pain  Functional change: none noted    Pain: 0/10 knee and 7-8/10 hip at night in bed  Location: Right knee    TREATMENT        Keith Mccurdy received therapeutic exercises to develop strength, flexibility and posture for 30 minutes with the physical therapist including the following:    Keith Mccurdy received therapeutic exercises to develop strength, endurance, ROM, flexibility, posture and core stabilization for 40 minutes including:    Exercise 1/28/2020   Bike 10'   Belt Blocks 3 x 10   Bridges 3 x 10, with red theraband   Hamstring stretch 3 x 30" holds   Clamshells 3 x 10 with ball " "between feet   S/l hip abd 3 x 10   Prone hip ext 3 x 10   Prone quad stretch 3 x 30" holds   Piriformis stretch 3 x 30" holds   x = exercise details same as prior session       Keith Mccurdy received the following manual therapy techniques: myofascial release of the right tensor fascia ranulfo, piriformis muscle, and soft tissue mobilization to the right iliotibial band and quadriceps for 10 minutes.       Home Exercises Provided and Patient Education Provided   Educated patient on performing SLRs at home.     Written Home Exercises Provided: Patient instructed to cont prior HEP.  Exercises were reviewed and Keith Mccurdy was able to demonstrate them prior to the end of the session.  Keith Mccurdy demonstrated good understanding of the education provided.     See EMR under Patient Instructions for exercises provided prior visit.    ASSESSMENT     Patient did well with treatment again today. He completed exercises without difficulty or complaint. His LE and core strength continue to improve each visit. Good relief reported with manual therapy today. Patient progressing well towards goals.    Keith Mccurdy is progressing well towards his goals.   Pt prognosis is Good.     Pt will continue to benefit from skilled outpatient physical therapy to address the deficits listed in the problem list box on initial evaluation, provide pt/family education and to maximize pt's level of independence in the home and community environment.     Pt's spiritual, cultural and educational needs considered and pt agreeable to plan of care and goals.     Anticipated barriers to physical therapy: none    Goals:   Short Term Goals:  3 weeks     1. Pain: Pt will demonstrate improved pain by reports of less than or equal to 6/10 worst pain on the verbal rating scale in order to progress toward maximal functional ability and improve QOL.  Met 11/22/2019   2. Function: Patient will demonstrate improved function as indicated by a score of " greater than or equal to 70% on the Lower Extremity Functional Scale  Met 1/14/2020   3. Mobility: Patient will improve AROM to 50% of stated goals, listed in objective measures above, in order to progress towards independence with functional activities.  Progressing, not met 1/14/2020   4. Strength: Patient will improve strength to 50% of stated goals, listed in objective measures above, in order to progress towards independence with functional activities.  Progressing, not met 1/14/2020   5. HEP: Patient will demonstrate independence with HEP in order to progress toward functional independence.  Met 11/22/2019      Long Term Goals:  6 weeks     1. Pain: Pt will demonstrate improved pain by reports of less than or equal to 5/10 worst pain on the verbal rating scale in order to progress toward maximal functional ability and improve QOL.   Met 11/22/2019   2. Function: Patient will demonstrate improved function as indicated by a score of greater than or equal to 72% on the Lower Extremity Functional Scale Met 1/14/2020   3. Mobility: Patient will improve AROM to stated goals, listed in objective measures above, in order to return to maximal functional potential and improve quality of life. Progressing, not met 1/14/2020   4. Strength: Patient will improve strength to stated goals, listed in objective measures above, in order to improve functional independence and quality of life. Progressing, not met 1/14/2020   5. Patient will return to normal ADL's, IADL's, community involvement, recreational activities, and work-related activities with less than or equal to 5/10 pain and maximal function.  Met 11/22/2019     PLAN   Plan of care Certification: 1/14/2020 to 4/13/2020.    Continue Plan of Care (POC) and progress per patient tolerance.    Paula Bermudez, PT, DPT, CSCS

## 2020-02-08 ENCOUNTER — PATIENT MESSAGE (OUTPATIENT)
Dept: REHABILITATION | Facility: HOSPITAL | Age: 75
End: 2020-02-08

## 2020-02-13 ENCOUNTER — PATIENT MESSAGE (OUTPATIENT)
Dept: PHYSICAL MEDICINE AND REHAB | Facility: CLINIC | Age: 75
End: 2020-02-13

## 2020-02-13 ENCOUNTER — CLINICAL SUPPORT (OUTPATIENT)
Dept: REHABILITATION | Facility: HOSPITAL | Age: 75
End: 2020-02-13
Payer: MEDICARE

## 2020-02-13 ENCOUNTER — HOSPITAL ENCOUNTER (OUTPATIENT)
Dept: RADIOLOGY | Facility: HOSPITAL | Age: 75
Discharge: HOME | End: 2020-02-13
Attending: PHYSICAL MEDICINE & REHABILITATION
Payer: MEDICARE

## 2020-02-13 DIAGNOSIS — M25.561 CHRONIC PAIN OF RIGHT KNEE: ICD-10-CM

## 2020-02-13 DIAGNOSIS — M70.71 BURSITIS OF OTHER BURSA OF RIGHT HIP: ICD-10-CM

## 2020-02-13 DIAGNOSIS — M70.71 BURSITIS OF OTHER BURSA OF RIGHT HIP: Primary | ICD-10-CM

## 2020-02-13 DIAGNOSIS — G89.29 CHRONIC PAIN OF RIGHT KNEE: ICD-10-CM

## 2020-02-13 DIAGNOSIS — S76.111A QUADRICEPS MUSCLE STRAIN, RIGHT, INITIAL ENCOUNTER: ICD-10-CM

## 2020-02-13 PROCEDURE — 72110 XR LUMBAR SPINE 5 VIEW WITH FLEX AND EXT: ICD-10-PCS | Mod: 26,,, | Performed by: RADIOLOGY

## 2020-02-13 PROCEDURE — 73521 X-RAY EXAM HIPS BI 2 VIEWS: CPT | Mod: 26,,, | Performed by: RADIOLOGY

## 2020-02-13 PROCEDURE — 97530 THERAPEUTIC ACTIVITIES: CPT | Performed by: PHYSICAL THERAPIST

## 2020-02-13 PROCEDURE — 73521 XR HIPS BILATERAL 2 VIEW INCL AP PELVIS: ICD-10-PCS | Mod: 26,,, | Performed by: RADIOLOGY

## 2020-02-13 PROCEDURE — 97110 THERAPEUTIC EXERCISES: CPT | Performed by: PHYSICAL THERAPIST

## 2020-02-13 PROCEDURE — 72110 X-RAY EXAM L-2 SPINE 4/>VWS: CPT | Mod: 26,,, | Performed by: RADIOLOGY

## 2020-02-13 PROCEDURE — 72110 X-RAY EXAM L-2 SPINE 4/>VWS: CPT | Mod: TC

## 2020-02-13 PROCEDURE — 73521 X-RAY EXAM HIPS BI 2 VIEWS: CPT | Mod: TC

## 2020-02-13 NOTE — PROGRESS NOTES
"  Physical Therapy Daily Treatment Note     Name: Keith Mccurdy  [facility] Number: [id number]    Therapy Diagnosis:   Encounter Diagnoses   Name Primary?    Chronic pain of right knee     Quadriceps muscle strain, right, initial encounter      Physician: Laura Shaikh MD    Visit Date: 2/13/2020    Physician Orders: PT Eval and Treat:  core/lumbar program, patellofemoral and quadriceps program with gentle stretching/strengthening, hip abductor/ gluteal strengthening and stretching, gait training; modalities as needed - ok for ultrasound, ionto/phonophoresis, deep heat, manual massage, stim treatment, TENS, pilates/yoga stretching, acupuncture, and other modalities at discretion of therapist, ok for aquatherapy  Medical Diagnosis from Referral: Quadriceps muscle strain, right, sequela (S76.111S), primary osteoarthritis of right knee (M17.11), chronic pain of right knee (M25.561, G89.29)  Evaluation Date: 10/18/2019  Authorization Period Expiration: 4/13/2020  Plan of Care Expiration: 4/13/2020  Visit # / Visits authorized: 19/ 20    Precautions:Standard    Time In: 9:00am  Time Out: 9:55am  Total Billable Time: 50 minutes    SUBJECTIVE     Pt reports that he has been researching his condition. He feels that he needs an x-ray on his hip to make sure that nothing is "out of place". PT educated patient on how he would most likely be in severe pain if anything was dislocated. He would still like to see what x-rays show. Patient also reports that he still has pain when first getting up after prolonged sitting or sleeping. He mentioned that he feels some pain in his right hip when trying to play golf.     He was compliant with home exercise program.  Response to previous treatment: decreased pain  Functional change: none noted    Pain: 0/10 knee and 7-8/10 hip at night in bed  Location: Right knee    TREATMENT       Keith Mccurdy received therapeutic exercises to develop strength, endurance, ROM, flexibility, posture and " "core stabilization for 40 minutes including:    Exercise 2/13/2020   Bike 10'   Belt Blocks 3 x 10   Bridges 3 x 10, with red theraband   Hamstring stretch 3 x 30" holds   Clamshells 3 x 10 with ball between feet   S/l hip abd 3 x 10   Prone hip ext 3 x 10   Prone quad stretch 3 x 30" holds   Piriformis stretch 3 x 30" holds   x = exercise details same as prior session         Keith Mccurdy participated in dynamic functional therapeutic activities to improve functional performance for 10  minutes, including:    Exercise 2/13/2020   Standing hip ext for strength and functional SLS 15 x each   Standing hip abd for strength and functional SLS 15x each   Golf swings With red theraband, 10x each side                       x = exercise details same as prior session      Keith Mccurdy received the following manual therapy techniques: myofascial release of the right tensor fascia ranulfo, piriformis muscle, and soft tissue mobilization to the right iliotibial band and quadriceps for 0 minutes.       Home Exercises Provided and Patient Education Provided       Written Home Exercises Provided: Patient instructed to cont prior HEP.  Exercises were reviewed and Keith Mccurdy was able to demonstrate them prior to the end of the session.  Keith Mccurdy demonstrated good understanding of the education provided.     See EMR under Patient Instructions for exercises provided prior visit.    ASSESSMENT     Patient tolerated treatment well today. He was able to be progressed to more standing activities today without issue. Practiced functional gold swing, and patient denied any increased complaints of pain. He denied manual therapy today, stating that he has a roller that he uses to work on his thigh and hip at home. Patient left with reports of feeling better post treatment. He is appropriate for DC next visit.    Keith Mccurdy is progressing well towards his goals.   Pt prognosis is Good.     Pt will continue to benefit from " skilled outpatient physical therapy to address the deficits listed in the problem list box on initial evaluation, provide pt/family education and to maximize pt's level of independence in the home and community environment.     Pt's spiritual, cultural and educational needs considered and pt agreeable to plan of care and goals.     Anticipated barriers to physical therapy: none    Goals:   Short Term Goals:  3 weeks     1. Pain: Pt will demonstrate improved pain by reports of less than or equal to 6/10 worst pain on the verbal rating scale in order to progress toward maximal functional ability and improve QOL.  Met 11/22/2019   2. Function: Patient will demonstrate improved function as indicated by a score of greater than or equal to 70% on the Lower Extremity Functional Scale  Met 1/14/2020   3. Mobility: Patient will improve AROM to 50% of stated goals, listed in objective measures above, in order to progress towards independence with functional activities.  Progressing, not met 1/14/2020   4. Strength: Patient will improve strength to 50% of stated goals, listed in objective measures above, in order to progress towards independence with functional activities.  Progressing, not met 1/14/2020   5. HEP: Patient will demonstrate independence with HEP in order to progress toward functional independence.  Met 11/22/2019      Long Term Goals:  6 weeks     1. Pain: Pt will demonstrate improved pain by reports of less than or equal to 5/10 worst pain on the verbal rating scale in order to progress toward maximal functional ability and improve QOL.   Met 11/22/2019   2. Function: Patient will demonstrate improved function as indicated by a score of greater than or equal to 72% on the Lower Extremity Functional Scale Met 1/14/2020   3. Mobility: Patient will improve AROM to stated goals, listed in objective measures above, in order to return to maximal functional potential and improve quality of life. Progressing, not met  1/14/2020   4. Strength: Patient will improve strength to stated goals, listed in objective measures above, in order to improve functional independence and quality of life. Progressing, not met 1/14/2020   5. Patient will return to normal ADL's, IADL's, community involvement, recreational activities, and work-related activities with less than or equal to 5/10 pain and maximal function.  Met 11/22/2019     PLAN   Plan of care Certification: 1/14/2020 to 4/13/2020.    Continue Plan of Care (POC) and progress per patient tolerance.    Paula Bermudez, PT, DPT, CSCS

## 2020-02-17 ENCOUNTER — PATIENT MESSAGE (OUTPATIENT)
Dept: REHABILITATION | Facility: HOSPITAL | Age: 75
End: 2020-02-17

## 2020-02-20 ENCOUNTER — CLINICAL SUPPORT (OUTPATIENT)
Dept: REHABILITATION | Facility: HOSPITAL | Age: 75
End: 2020-02-20
Payer: MEDICARE

## 2020-02-20 DIAGNOSIS — M25.561 CHRONIC PAIN OF RIGHT KNEE: ICD-10-CM

## 2020-02-20 DIAGNOSIS — S76.111A QUADRICEPS MUSCLE STRAIN, RIGHT, INITIAL ENCOUNTER: ICD-10-CM

## 2020-02-20 DIAGNOSIS — G89.29 CHRONIC PAIN OF RIGHT KNEE: ICD-10-CM

## 2020-02-20 PROCEDURE — 97530 THERAPEUTIC ACTIVITIES: CPT | Performed by: PHYSICAL THERAPIST

## 2020-02-20 PROCEDURE — 97110 THERAPEUTIC EXERCISES: CPT | Performed by: PHYSICAL THERAPIST

## 2020-02-20 NOTE — PROGRESS NOTES
Physical Therapy Discharge Note     Name: Keith Mccurdy  [facility] Number: [id number]    Therapy Diagnosis:   Encounter Diagnoses   Name Primary?    Chronic pain of right knee     Quadriceps muscle strain, right, initial encounter      Physician: Laura Shaikh MD    Visit Date: 2/20/2020    Physician Orders: PT Eval and Treat:  core/lumbar program, patellofemoral and quadriceps program with gentle stretching/strengthening, hip abductor/ gluteal strengthening and stretching, gait training; modalities as needed - ok for ultrasound, ionto/phonophoresis, deep heat, manual massage, stim treatment, TENS, pilates/yoga stretching, acupuncture, and other modalities at discretion of therapist, ok for aquatherapy  Medical Diagnosis from Referral: Quadriceps muscle strain, right, sequela (S76.111S), primary osteoarthritis of right knee (M17.11), chronic pain of right knee (M25.561, G89.29)  Evaluation Date: 10/18/2019  Authorization Period Expiration: 4/13/2020  Plan of Care Expiration: 4/13/2020  Visit # / Visits authorized: 22    Precautions:Standard    Time In: 9:00am  Time Out: 9:55am  Total Billable Time: 50 minutes    Date of Last visit: 2/20/2020  Total Visits Received: 22  Cancelled Visits: 9  No Show Visits: 4      SUBJECTIVE     Pt reports that he is feeling much better overall. He states that he is feeling a little pain in his ITB lately, but it feels better with exercises. Patient reports that he consistently keeps up with HEP, and he feels confident in his ability to perform exercises on his own at this time. He is okay with DC with HEP at this time and will contact us in the future if additional PT is necessary.     He was compliant with home exercise program.  Response to previous treatment: decreased pain  Functional change: none noted    Pain: 0/10 knee and 4/10 hip while playing golf  Location: Right knee    OBJECTIVE     Update: (x = not tested due to pain and/or inability to obtain test position)     RANGE OF  "MOTION:     Knee ROM Right  1/14/2020 Left  1/14/2020 Right   2/20/2020  Left  2/20/2020    Knee Flexion (135) 120 132  130 135    Knee Extension (0) 10 0  0 0       STRENGTH:     L/E MMT Right  1/14/2020 Left  1/14/2020 Right   2/20/2020  Left  2/20/2020  Goal   Hip Flexion  5/5 5/5  5 5 5/5 B    Hip Extension  4+/5 4+/5  5 5 5/5 B   Hip Abduction  5/5 5/5  5 5 5/5 B   Knee Extension 5/5 5/5  5 5 5/5 B   Knee Flexion 5/5 5/5  5 5 5/5 B         MUSCLE LENGTH:      Muscle Tested  Right  11/29/2019 Left   10/18/2019 Goal   Hamstrings  normal normal Normal B        TREATMENT       Keith Mccurdy received therapeutic exercises to develop strength, endurance, ROM, flexibility, posture and core stabilization for 40 minutes including:    Exercise 2/20/2020   Bike 10'   Belt Blocks 3 x 10   Bridges 3 x 10, with red theraband   Hamstring stretch 3 x 30" holds   Clamshells 3 x 10 with ball between feet   S/l hip abd 3 x 10   Prone hip ext 3 x 10   Prone quad stretch 3 x 30" holds   Piriformis stretch 3 x 30" holds   x = exercise details same as prior session         Keith Mccurdy participated in dynamic functional therapeutic activities to improve functional performance for 10  minutes, including:    Exercise 2/20/2020   Standing hip ext for strength and functional SLS 15 x each   Standing hip abd for strength and functional SLS 15x each   Golf swings With red theraband, 10x each side                       x = exercise details same as prior session      Keith Mccurdy received the following manual therapy techniques: myofascial release of the right tensor fascia ranulfo, piriformis muscle, and soft tissue mobilization to the right iliotibial band and quadriceps for 0 minutes.       Home Exercises Provided and Patient Education Provided       Written Home Exercises Provided: Patient instructed to cont prior HEP.  Exercises were reviewed and Keith Mccurdy was able to demonstrate them prior to the end of the session.  Keith SANDOVAL" Kaz demonstrated good understanding of the education provided.     See EMR under Patient Instructions for exercises provided prior visit.    ASSESSMENT     Patient did well with treatment today and completed all exercises without difficulty or complaint. He demonstrates improvements in ROM, LE strength, and overall core stability. Patient was progressed to more functional standing activities in recent visits, and he was even able to perform multiple golf swing repetitions without pain. He presents with decreased soft tissue adhesions and decreased TTP. Patient demonstrates good independence and understanding of exercises. He is appropriate for DC with HEP at this time.     Keith Mccurdy is progressing well towards his goals.   Pt prognosis is Good.     Pt will continue to benefit from skilled outpatient physical therapy to address the deficits listed in the problem list box on initial evaluation, provide pt/family education and to maximize pt's level of independence in the home and community environment.     Pt's spiritual, cultural and educational needs considered and pt agreeable to plan of care and goals.     Anticipated barriers to physical therapy: none    Goals:   Short Term Goals:  3 weeks     1. Pain: Pt will demonstrate improved pain by reports of less than or equal to 6/10 worst pain on the verbal rating scale in order to progress toward maximal functional ability and improve QOL.  Met 11/22/2019   2. Function: Patient will demonstrate improved function as indicated by a score of greater than or equal to 70% on the Lower Extremity Functional Scale  Met 1/14/2020   3. Mobility: Patient will improve AROM to 50% of stated goals, listed in objective measures above, in order to progress towards independence with functional activities.  Met  2/20/2020   4. Strength: Patient will improve strength to 50% of stated goals, listed in objective measures above, in order to progress towards independence with  functional activities.  Met  2/20/2020   5. HEP: Patient will demonstrate independence with HEP in order to progress toward functional independence.  Met 11/22/2019      Long Term Goals:  6 weeks     1. Pain: Pt will demonstrate improved pain by reports of less than or equal to 5/10 worst pain on the verbal rating scale in order to progress toward maximal functional ability and improve QOL.   Met 11/22/2019   2. Function: Patient will demonstrate improved function as indicated by a score of greater than or equal to 72% on the Lower Extremity Functional Scale Met 1/14/2020   3. Mobility: Patient will improve AROM to stated goals, listed in objective measures above, in order to return to maximal functional potential and improve quality of life. Met 2/20/2020   4. Strength: Patient will improve strength to stated goals, listed in objective measures above, in order to improve functional independence and quality of life. Met  2/20/2020   5. Patient will return to normal ADL's, IADL's, community involvement, recreational activities, and work-related activities with less than or equal to 5/10 pain and maximal function.  Met 11/22/2019     PLAN   Plan of care Certification: 1/14/2020 to 4/13/2020.    2/20/2020: Patient is considered DC from PT with HEP at this time.     Paula Bermudez, PT, DPT, CSCS

## 2020-02-21 NOTE — PLAN OF CARE
Physical Therapy Discharge Note     Name: Keith Mccurdy  [facility] Number: [id number]    Therapy Diagnosis:   Encounter Diagnoses   Name Primary?    Chronic pain of right knee     Quadriceps muscle strain, right, initial encounter      Physician: Laura Shaikh MD    Visit Date: 2/20/2020    Physician Orders: PT Eval and Treat:  core/lumbar program, patellofemoral and quadriceps program with gentle stretching/strengthening, hip abductor/ gluteal strengthening and stretching, gait training; modalities as needed - ok for ultrasound, ionto/phonophoresis, deep heat, manual massage, stim treatment, TENS, pilates/yoga stretching, acupuncture, and other modalities at discretion of therapist, ok for aquatherapy  Medical Diagnosis from Referral: Quadriceps muscle strain, right, sequela (S76.111S), primary osteoarthritis of right knee (M17.11), chronic pain of right knee (M25.561, G89.29)  Evaluation Date: 10/18/2019  Authorization Period Expiration: 4/13/2020  Plan of Care Expiration: 4/13/2020  Visit # / Visits authorized: 22    Precautions:Standard    Time In: 9:00am  Time Out: 9:55am  Total Billable Time: 50 minutes    Date of Last visit: 2/20/2020  Total Visits Received: 22  Cancelled Visits: 9  No Show Visits: 4      SUBJECTIVE     Pt reports that he is feeling much better overall. He states that he is feeling a little pain in his ITB lately, but it feels better with exercises. Patient reports that he consistently keeps up with HEP, and he feels confident in his ability to perform exercises on his own at this time. He is okay with DC with HEP at this time and will contact us in the future if additional PT is necessary.     He was compliant with home exercise program.  Response to previous treatment: decreased pain  Functional change: none noted    Pain: 0/10 knee and 4/10 hip while playing golf  Location: Right knee    OBJECTIVE     Update: (x = not tested due to pain and/or inability to obtain test position)     RANGE OF  "MOTION:     Knee ROM Right  1/14/2020 Left  1/14/2020 Right   2/20/2020  Left  2/20/2020    Knee Flexion (135) 120 132  130 135    Knee Extension (0) 10 0  0 0       STRENGTH:     L/E MMT Right  1/14/2020 Left  1/14/2020 Right   2/20/2020  Left  2/20/2020  Goal   Hip Flexion  5/5 5/5  5 5 5/5 B    Hip Extension  4+/5 4+/5  5 5 5/5 B   Hip Abduction  5/5 5/5  5 5 5/5 B   Knee Extension 5/5 5/5  5 5 5/5 B   Knee Flexion 5/5 5/5  5 5 5/5 B         MUSCLE LENGTH:      Muscle Tested  Right  11/29/2019 Left   10/18/2019 Goal   Hamstrings  normal normal Normal B        TREATMENT       Keith Mccurdy received therapeutic exercises to develop strength, endurance, ROM, flexibility, posture and core stabilization for 40 minutes including:    Exercise 2/20/2020   Bike 10'   Belt Blocks 3 x 10   Bridges 3 x 10, with red theraband   Hamstring stretch 3 x 30" holds   Clamshells 3 x 10 with ball between feet   S/l hip abd 3 x 10   Prone hip ext 3 x 10   Prone quad stretch 3 x 30" holds   Piriformis stretch 3 x 30" holds   x = exercise details same as prior session         Keith Mccurdy participated in dynamic functional therapeutic activities to improve functional performance for 10  minutes, including:    Exercise 2/20/2020   Standing hip ext for strength and functional SLS 15 x each   Standing hip abd for strength and functional SLS 15x each   Golf swings With red theraband, 10x each side                       x = exercise details same as prior session      Keith Mccurdy received the following manual therapy techniques: myofascial release of the right tensor fascia ranulfo, piriformis muscle, and soft tissue mobilization to the right iliotibial band and quadriceps for 0 minutes.       Home Exercises Provided and Patient Education Provided       Written Home Exercises Provided: Patient instructed to cont prior HEP.  Exercises were reviewed and Keith Mccurdy was able to demonstrate them prior to the end of the session.  Keith SANDOVAL" Kaz demonstrated good understanding of the education provided.     See EMR under Patient Instructions for exercises provided prior visit.    ASSESSMENT     Patient did well with treatment today and completed all exercises without difficulty or complaint. He demonstrates improvements in ROM, LE strength, and overall core stability. Patient was progressed to more functional standing activities in recent visits, and he was even able to perform multiple golf swing repetitions without pain. He presents with decreased soft tissue adhesions and decreased TTP. Patient demonstrates good independence and understanding of exercises. He is appropriate for DC with HEP at this time.     Keith Mccurdy is progressing well towards his goals.   Pt prognosis is Good.     Pt will continue to benefit from skilled outpatient physical therapy to address the deficits listed in the problem list box on initial evaluation, provide pt/family education and to maximize pt's level of independence in the home and community environment.     Pt's spiritual, cultural and educational needs considered and pt agreeable to plan of care and goals.     Anticipated barriers to physical therapy: none    Goals:   Short Term Goals:  3 weeks     1. Pain: Pt will demonstrate improved pain by reports of less than or equal to 6/10 worst pain on the verbal rating scale in order to progress toward maximal functional ability and improve QOL.  Met 11/22/2019   2. Function: Patient will demonstrate improved function as indicated by a score of greater than or equal to 70% on the Lower Extremity Functional Scale  Met 1/14/2020   3. Mobility: Patient will improve AROM to 50% of stated goals, listed in objective measures above, in order to progress towards independence with functional activities.  Met  2/20/2020   4. Strength: Patient will improve strength to 50% of stated goals, listed in objective measures above, in order to progress towards independence with  functional activities.  Met  2/20/2020   5. HEP: Patient will demonstrate independence with HEP in order to progress toward functional independence.  Met 11/22/2019      Long Term Goals:  6 weeks     1. Pain: Pt will demonstrate improved pain by reports of less than or equal to 5/10 worst pain on the verbal rating scale in order to progress toward maximal functional ability and improve QOL.   Met 11/22/2019   2. Function: Patient will demonstrate improved function as indicated by a score of greater than or equal to 72% on the Lower Extremity Functional Scale Met 1/14/2020   3. Mobility: Patient will improve AROM to stated goals, listed in objective measures above, in order to return to maximal functional potential and improve quality of life. Met 2/20/2020   4. Strength: Patient will improve strength to stated goals, listed in objective measures above, in order to improve functional independence and quality of life. Met  2/20/2020   5. Patient will return to normal ADL's, IADL's, community involvement, recreational activities, and work-related activities with less than or equal to 5/10 pain and maximal function.  Met 11/22/2019     PLAN   Plan of care Certification: 1/14/2020 to 4/13/2020.    2/20/2020: Patient is considered DC from PT with HEP at this time.     Paula Bermudez, PT, DPT, CSCS

## 2020-03-24 PROBLEM — M25.561 CHRONIC PAIN OF RIGHT KNEE: Status: RESOLVED | Noted: 2019-10-18 | Resolved: 2020-03-24

## 2020-03-24 PROBLEM — G89.29 CHRONIC PAIN OF RIGHT KNEE: Status: RESOLVED | Noted: 2019-10-18 | Resolved: 2020-03-24

## 2020-03-24 PROBLEM — S76.111A QUADRICEPS MUSCLE STRAIN, RIGHT, INITIAL ENCOUNTER: Status: RESOLVED | Noted: 2019-10-18 | Resolved: 2020-03-24

## 2021-07-27 ENCOUNTER — IMMUNIZATION (OUTPATIENT)
Dept: INTERNAL MEDICINE | Facility: CLINIC | Age: 76
End: 2021-07-27
Payer: MEDICARE

## 2021-07-27 DIAGNOSIS — Z23 NEED FOR VACCINATION: Primary | ICD-10-CM

## 2021-07-27 PROCEDURE — 91300 COVID-19, MRNA, LNP-S, PF, 30 MCG/0.3 ML DOSE VACCINE: CPT | Mod: PBBFAC

## 2021-08-17 ENCOUNTER — IMMUNIZATION (OUTPATIENT)
Dept: PRIMARY CARE CLINIC | Facility: CLINIC | Age: 76
End: 2021-08-17
Payer: MEDICARE

## 2021-08-17 DIAGNOSIS — Z23 NEED FOR VACCINATION: Primary | ICD-10-CM

## 2021-08-17 PROCEDURE — 0002A COVID-19, MRNA, LNP-S, PF, 30 MCG/0.3 ML DOSE VACCINE: CPT | Mod: CV19,S$GLB,, | Performed by: FAMILY MEDICINE

## 2021-08-17 PROCEDURE — 0002A COVID-19, MRNA, LNP-S, PF, 30 MCG/0.3 ML DOSE VACCINE: ICD-10-PCS | Mod: CV19,S$GLB,, | Performed by: FAMILY MEDICINE

## 2021-08-17 PROCEDURE — 91300 COVID-19, MRNA, LNP-S, PF, 30 MCG/0.3 ML DOSE VACCINE: CPT | Mod: S$GLB,,, | Performed by: FAMILY MEDICINE

## 2021-08-17 PROCEDURE — 91300 COVID-19, MRNA, LNP-S, PF, 30 MCG/0.3 ML DOSE VACCINE: ICD-10-PCS | Mod: S$GLB,,, | Performed by: FAMILY MEDICINE

## 2022-02-28 ENCOUNTER — IMMUNIZATION (OUTPATIENT)
Dept: PHARMACY | Facility: CLINIC | Age: 77
End: 2022-02-28
Payer: MEDICARE

## 2022-02-28 DIAGNOSIS — Z23 NEED FOR VACCINATION: Primary | ICD-10-CM

## 2023-05-19 ENCOUNTER — OFFICE VISIT (OUTPATIENT)
Dept: PODIATRY | Facility: CLINIC | Age: 78
End: 2023-05-19
Payer: MEDICARE

## 2023-05-19 VITALS — WEIGHT: 207 LBS | HEIGHT: 73 IN | BODY MASS INDEX: 27.43 KG/M2

## 2023-05-19 DIAGNOSIS — L84 CORN OR CALLUS: ICD-10-CM

## 2023-05-19 DIAGNOSIS — L60.0 INGROWN TOENAIL OF LEFT FOOT: Primary | ICD-10-CM

## 2023-05-19 PROCEDURE — 99203 PR OFFICE/OUTPT VISIT, NEW, LEVL III, 30-44 MIN: ICD-10-PCS | Mod: S$PBB,,, | Performed by: PODIATRIST

## 2023-05-19 PROCEDURE — 99212 OFFICE O/P EST SF 10 MIN: CPT | Mod: PBBFAC | Performed by: PODIATRIST

## 2023-05-19 PROCEDURE — 99999 PR PBB SHADOW E&M-EST. PATIENT-LVL II: CPT | Mod: PBBFAC,,, | Performed by: PODIATRIST

## 2023-05-19 PROCEDURE — 99999 PR PBB SHADOW E&M-EST. PATIENT-LVL II: ICD-10-PCS | Mod: PBBFAC,,, | Performed by: PODIATRIST

## 2023-05-19 PROCEDURE — 99203 OFFICE O/P NEW LOW 30 MIN: CPT | Mod: S$PBB,,, | Performed by: PODIATRIST

## 2023-05-19 NOTE — PROGRESS NOTES
PODIATRIC MEDICINE AND SURGERY   5/19/2023    Reason for Visit   Chief Complaint   Patient presents with    Nail Problem     C/o discomfort in right hallux, rates 5/10, x 6 months, pain is worse when walking and standing, Pt is also c/o build up on the right 5th toe that is causing discomfort, non diabetic wears tennis shoes and socks, Last seen PCP Dr. Ortiz 04/21/2023         HPI  This is Keith Mccurdy is a 78 y.o. male who presents today complaining of painful ingrown toenail on left great toe. Pt states painful toenail for 6 months. Symptom are worse in certain shoes.  Previous treatment includes none.  Patient denies other pedal complaints at this time. Denies any N/V/F/C/SOB/CP.     [facility]  Patient Active Problem List    Diagnosis Date Noted    Stable central retinal vein occlusion of right eye 09/24/2019    Retinoschisis, right eye 09/24/2019    Hearing loss, sensorineural, asymmetrical 11/06/2018    Right-sided Bell's palsy 11/06/2018    Vestibular hypofunction of right ear 11/06/2018     History reviewed. No pertinent past medical history.    MEDS  Current Outpatient Medications on File Prior to Visit   Medication Sig Dispense Refill    COVID-19 vac, boo,Pfizer,,PF, (PFIZER COVID-19 BOO VACCN,PF,) 30 mcg/0.3 mL injection Inject into the muscle. 0.3 mL 0    ibuprofen (ADVIL,MOTRIN) 600 MG tablet every 4 (four) hours as needed.       ILEVRO 0.3 % DrpS PUT 1 DROP EVERY DAY INTO RIGHT EYE  3    tafluprost, PF, (ZIOPTAN, PF,) 0.0015 % Dpet 1 drop.       No current facility-administered medications on file prior to visit.       [facility]     Past Surgical History:   Procedure Laterality Date    HERNIA REPAIR          ALL  Review of patient's allergies indicates:  No Known Allergies    SOC     Social History     Tobacco Use    Smoking status: Never       Family HX  History reviewed. No pertinent family history.       REVIEW OF SYSTEMS   General: This patient is well-developed, well-nourished and appears stated age,  "well-oriented to person, place and time, and cooperative and pleasant on today's visit  Constitutional: Negative for chills and fever.   Respiratory: Negative for shortness of breath.    Cardiovascular: Negative for chest pain, palpitations, orthopnea  Gastrointestinal: Negative for diarrhea, nausea and vomiting.   Musculoskeletal: Positive for above noted in HPI  Skin: Positive for nail changes   Peripheral Vascular: no claudication or cyanosis  Psychiatric/Behavioral: Negative for altered mental status       Vitals:    05/19/23 0850   Weight: 93.9 kg (207 lb)   Height: 6' 1" (1.854 m)   PainSc:   5       LOWER EXTREMITY PHYSICAL EXAM    VASCULAR  Dorsalis pedis and posterior tibial pulses palpable 2/4 bilaterally.   Capillary refill time immediate to the toes.   Feet are warm to the touch. Skin temperature warm to warm from proximally to distally   There are no ecchymoses noted to bilateral foot and ankle regions.   There is no  edema noted to foot    DERMATOLOGIC  Skin moist with healthy texture and turgor.  Incurvated left hallux bl border without acute signs of infection.   There are no open ulcerations, lacerations, or fissures to bilateral foot and ankle regions.   There are diffus hyperkeratotic lesions noted to feet. Nails are well-trimmed.    NEUROLOGIC  Epicritic sensation is intact as the patient is able to sense light touch to bilateral foot and ankle regions.   Achilles and patellar deep tendon reflexes intact  Babinski reflex absent    ORTHOPEDIC/BIOMECHANICAL  Tenderness to palpation b/l border of left hallux.   Muscle strength AT/EHL/EDL/PT: 5/5; Achilles/Gastroc/Soleus: 5/5; PB/PL: 5/5 Muscle tone is normal.  Ankle joint ROM INTACT DF/PF, non-crepitus    ASSESSMENT  Encounter Diagnoses   Name Primary?    Ingrown toenail of left foot Yes    Corn or callus          PLAN    -Discussed presenting problems, etiology, pathologic processes and management options with patient today.     Utilizing sterile " toenail clippers I aggressively trimmed the offending nail border approximately 3 mm from its edge and carried the nail plate incision down at an angle in order to wedge out the offending cryptotic portion of the nail plate. The offending border was then removed in toto. The remaining nail was grinded down with an electric  down to nail bed. Minimal blood was drawn. Applied betadine and covered with band-aid. Patient tolerated the procedure well and related significant relief.    -Patient received instructions for post op care, pain management with OTC analgesics, and soaking.  -If any signs of infection--increased redness, purulent drainage, increased pain then patient is advised to return to clinic or ER if after clinic hours. Advised to limit activities.    Report Electronically Signed By:     Vicki Crowell DPM   Podiatric Medicine & Surgery  [facility]  5/19/2023

## 2025-08-21 ENCOUNTER — OFFICE VISIT (OUTPATIENT)
Dept: OTOLARYNGOLOGY | Facility: CLINIC | Age: 80
End: 2025-08-21
Payer: MEDICARE

## 2025-08-21 VITALS — HEIGHT: 73 IN | BODY MASS INDEX: 27.35 KG/M2 | WEIGHT: 206.38 LBS

## 2025-08-21 DIAGNOSIS — K11.5 SIALOLITHIASIS: Primary | ICD-10-CM

## 2025-08-21 PROCEDURE — 99999 PR PBB SHADOW E&M-EST. PATIENT-LVL III: CPT | Mod: PBBFAC,,, | Performed by: ORTHOPAEDIC SURGERY

## 2025-08-21 PROCEDURE — 99213 OFFICE O/P EST LOW 20 MIN: CPT | Mod: PBBFAC | Performed by: ORTHOPAEDIC SURGERY

## 2025-08-21 PROCEDURE — 99204 OFFICE O/P NEW MOD 45 MIN: CPT | Mod: S$PBB,,, | Performed by: ORTHOPAEDIC SURGERY

## 2025-08-21 RX ORDER — METFORMIN HYDROCHLORIDE 1000 MG/1
500 TABLET ORAL 2 TIMES DAILY WITH MEALS
COMMUNITY

## 2025-08-26 ENCOUNTER — TELEPHONE (OUTPATIENT)
Dept: OTOLARYNGOLOGY | Facility: CLINIC | Age: 80
End: 2025-08-26
Payer: MEDICARE

## 2025-08-28 ENCOUNTER — PATIENT MESSAGE (OUTPATIENT)
Dept: OTOLARYNGOLOGY | Facility: CLINIC | Age: 80
End: 2025-08-28
Payer: MEDICARE